# Patient Record
Sex: MALE | Race: WHITE | NOT HISPANIC OR LATINO
[De-identification: names, ages, dates, MRNs, and addresses within clinical notes are randomized per-mention and may not be internally consistent; named-entity substitution may affect disease eponyms.]

---

## 2017-02-02 ENCOUNTER — APPOINTMENT (OUTPATIENT)
Dept: COLORECTAL SURGERY | Facility: CLINIC | Age: 52
End: 2017-02-02

## 2017-04-06 ENCOUNTER — APPOINTMENT (OUTPATIENT)
Dept: COLORECTAL SURGERY | Facility: CLINIC | Age: 52
End: 2017-04-06

## 2017-05-05 ENCOUNTER — MEDICATION RENEWAL (OUTPATIENT)
Age: 52
End: 2017-05-05

## 2017-06-08 ENCOUNTER — APPOINTMENT (OUTPATIENT)
Dept: COLORECTAL SURGERY | Facility: CLINIC | Age: 52
End: 2017-06-08

## 2017-07-11 ENCOUNTER — OUTPATIENT (OUTPATIENT)
Dept: OUTPATIENT SERVICES | Facility: HOSPITAL | Age: 52
LOS: 1 days | End: 2017-07-11
Payer: COMMERCIAL

## 2017-07-11 VITALS
HEART RATE: 69 BPM | HEIGHT: 68 IN | OXYGEN SATURATION: 99 % | DIASTOLIC BLOOD PRESSURE: 84 MMHG | WEIGHT: 235.89 LBS | SYSTOLIC BLOOD PRESSURE: 122 MMHG | RESPIRATION RATE: 16 BRPM | TEMPERATURE: 97 F

## 2017-07-11 DIAGNOSIS — Z90.49 ACQUIRED ABSENCE OF OTHER SPECIFIED PARTS OF DIGESTIVE TRACT: Chronic | ICD-10-CM

## 2017-07-11 DIAGNOSIS — K43.2 INCISIONAL HERNIA WITHOUT OBSTRUCTION OR GANGRENE: ICD-10-CM

## 2017-07-11 DIAGNOSIS — G47.30 SLEEP APNEA, UNSPECIFIED: ICD-10-CM

## 2017-07-11 LAB
BUN SERPL-MCNC: 11 MG/DL — SIGNIFICANT CHANGE UP (ref 7–23)
CALCIUM SERPL-MCNC: 9.4 MG/DL — SIGNIFICANT CHANGE UP (ref 8.4–10.5)
CHLORIDE SERPL-SCNC: 103 MMOL/L — SIGNIFICANT CHANGE UP (ref 98–107)
CO2 SERPL-SCNC: 23 MMOL/L — SIGNIFICANT CHANGE UP (ref 22–31)
CREAT SERPL-MCNC: 0.8 MG/DL — SIGNIFICANT CHANGE UP (ref 0.5–1.3)
GLUCOSE SERPL-MCNC: 85 MG/DL — SIGNIFICANT CHANGE UP (ref 70–99)
HCT VFR BLD CALC: 48.1 % — SIGNIFICANT CHANGE UP (ref 39–50)
HGB BLD-MCNC: 17.1 G/DL — HIGH (ref 13–17)
MCHC RBC-ENTMCNC: 32.7 PG — SIGNIFICANT CHANGE UP (ref 27–34)
MCHC RBC-ENTMCNC: 35.6 % — SIGNIFICANT CHANGE UP (ref 32–36)
MCV RBC AUTO: 92 FL — SIGNIFICANT CHANGE UP (ref 80–100)
NRBC # FLD: 0 — SIGNIFICANT CHANGE UP
PLATELET # BLD AUTO: 218 K/UL — SIGNIFICANT CHANGE UP (ref 150–400)
PMV BLD: 11.5 FL — SIGNIFICANT CHANGE UP (ref 7–13)
POTASSIUM SERPL-MCNC: 4.3 MMOL/L — SIGNIFICANT CHANGE UP (ref 3.5–5.3)
POTASSIUM SERPL-SCNC: 4.3 MMOL/L — SIGNIFICANT CHANGE UP (ref 3.5–5.3)
RBC # BLD: 5.23 M/UL — SIGNIFICANT CHANGE UP (ref 4.2–5.8)
RBC # FLD: 13.8 % — SIGNIFICANT CHANGE UP (ref 10.3–14.5)
SODIUM SERPL-SCNC: 141 MMOL/L — SIGNIFICANT CHANGE UP (ref 135–145)
WBC # BLD: 6.6 K/UL — SIGNIFICANT CHANGE UP (ref 3.8–10.5)
WBC # FLD AUTO: 6.6 K/UL — SIGNIFICANT CHANGE UP (ref 3.8–10.5)

## 2017-07-11 PROCEDURE — 93010 ELECTROCARDIOGRAM REPORT: CPT

## 2017-07-11 NOTE — H&P PST ADULT - PMH
Diverticulitis large intestine    GERD (gastroesophageal reflux disease)    HTN (hypertension)    Obesity    Sleep apnea Diverticulitis large intestine    GERD (gastroesophageal reflux disease)    HTN (hypertension)    Incisional hernia    Obesity    Sleep apnea

## 2017-07-11 NOTE — H&P PST ADULT - HISTORY OF PRESENT ILLNESS
Pt. is a 53 yo male that had a colon resection 10/206.  Pt. now has an incisional hernia. Pt. is a 51 yo male that had a colon resection 10/2016.  Pt. now has an incisional hernia.

## 2017-07-11 NOTE — H&P PST ADULT - FAMILY HISTORY
Father  Still living? No  Family history of colon cancer, Age at diagnosis: Age Unknown     Mother  Still living? Yes, Estimated age: Age Unknown  Family history of diabetes mellitus (DM), Age at diagnosis: Age Unknown

## 2017-07-11 NOTE — H&P PST ADULT - ACTIVITY
"I walk anywhere from a mile and a half to 4 miles, I try and do it everyday" , utilizes subway stairs 5 days/wk

## 2017-07-13 RX ORDER — SODIUM CHLORIDE 9 MG/ML
1000 INJECTION, SOLUTION INTRAVENOUS
Qty: 0 | Refills: 0 | Status: DISCONTINUED | OUTPATIENT
Start: 2017-07-14 | End: 2017-07-14

## 2017-07-13 NOTE — ASU PATIENT PROFILE, ADULT - PMH
Diverticulitis large intestine    GERD (gastroesophageal reflux disease)    HTN (hypertension)    Incisional hernia    Obesity    Sleep apnea

## 2017-07-14 ENCOUNTER — INPATIENT (INPATIENT)
Facility: HOSPITAL | Age: 52
LOS: 3 days | Discharge: HOME HEALTH | End: 2017-07-18
Attending: STUDENT IN AN ORGANIZED HEALTH CARE EDUCATION/TRAINING PROGRAM | Admitting: STUDENT IN AN ORGANIZED HEALTH CARE EDUCATION/TRAINING PROGRAM
Payer: COMMERCIAL

## 2017-07-14 ENCOUNTER — APPOINTMENT (OUTPATIENT)
Dept: COLORECTAL SURGERY | Facility: HOSPITAL | Age: 52
End: 2017-07-14

## 2017-07-14 ENCOUNTER — RESULT REVIEW (OUTPATIENT)
Age: 52
End: 2017-07-14

## 2017-07-14 VITALS
SYSTOLIC BLOOD PRESSURE: 123 MMHG | OXYGEN SATURATION: 100 % | DIASTOLIC BLOOD PRESSURE: 80 MMHG | WEIGHT: 235.89 LBS | TEMPERATURE: 98 F | HEART RATE: 75 BPM | HEIGHT: 68 IN | RESPIRATION RATE: 15 BRPM

## 2017-07-14 DIAGNOSIS — Z90.49 ACQUIRED ABSENCE OF OTHER SPECIFIED PARTS OF DIGESTIVE TRACT: Chronic | ICD-10-CM

## 2017-07-14 DIAGNOSIS — K43.2 INCISIONAL HERNIA WITHOUT OBSTRUCTION OR GANGRENE: ICD-10-CM

## 2017-07-14 PROCEDURE — 88302 TISSUE EXAM BY PATHOLOGIST: CPT | Mod: 26

## 2017-07-14 PROCEDURE — 88304 TISSUE EXAM BY PATHOLOGIST: CPT | Mod: 26

## 2017-07-14 RX ORDER — HEPARIN SODIUM 5000 [USP'U]/ML
5000 INJECTION INTRAVENOUS; SUBCUTANEOUS EVERY 8 HOURS
Qty: 0 | Refills: 0 | Status: DISCONTINUED | OUTPATIENT
Start: 2017-07-14 | End: 2017-07-16

## 2017-07-14 RX ORDER — AMLODIPINE BESYLATE 2.5 MG/1
5 TABLET ORAL DAILY
Qty: 0 | Refills: 0 | Status: DISCONTINUED | OUTPATIENT
Start: 2017-07-15 | End: 2017-07-15

## 2017-07-14 RX ORDER — HYDROMORPHONE HYDROCHLORIDE 2 MG/ML
0.5 INJECTION INTRAMUSCULAR; INTRAVENOUS; SUBCUTANEOUS
Qty: 0 | Refills: 0 | Status: DISCONTINUED | OUTPATIENT
Start: 2017-07-14 | End: 2017-07-15

## 2017-07-14 RX ORDER — NALOXONE HYDROCHLORIDE 4 MG/.1ML
0.1 SPRAY NASAL
Qty: 0 | Refills: 0 | Status: DISCONTINUED | OUTPATIENT
Start: 2017-07-14 | End: 2017-07-15

## 2017-07-14 RX ORDER — ONDANSETRON 8 MG/1
4 TABLET, FILM COATED ORAL EVERY 6 HOURS
Qty: 0 | Refills: 0 | Status: DISCONTINUED | OUTPATIENT
Start: 2017-07-14 | End: 2017-07-15

## 2017-07-14 RX ORDER — SODIUM CHLORIDE 9 MG/ML
1000 INJECTION, SOLUTION INTRAVENOUS
Qty: 0 | Refills: 0 | Status: DISCONTINUED | OUTPATIENT
Start: 2017-07-14 | End: 2017-07-16

## 2017-07-14 RX ADMIN — ONDANSETRON 4 MILLIGRAM(S): 8 TABLET, FILM COATED ORAL at 18:20

## 2017-07-14 RX ADMIN — HEPARIN SODIUM 5000 UNIT(S): 5000 INJECTION INTRAVENOUS; SUBCUTANEOUS at 22:40

## 2017-07-14 NOTE — PROGRESS NOTE ADULT - ASSESSMENT
Pt. is a 53 yo male with pmhx of diverticulitis of the large intestine, HTN, GERD incisional hernia, colon resection, cholecystectomy now s/p incisional hernia repair.     - DVT ppx - heparin subq  - restart home meds  - dilaudid for pain  - NPO

## 2017-07-14 NOTE — BRIEF OPERATIVE NOTE - OPERATION/FINDINGS
Alessandro-Stoppa repair of ventral incisional hernias, bilateral component separation Alessandro-Stoppa repair of ventral incisional hernia, bilateral component separation

## 2017-07-14 NOTE — PROGRESS NOTE ADULT - SUBJECTIVE AND OBJECTIVE BOX
B Team Surgery Post-op Note    Procedure: Incisional hernia repair with mesh    S: Patient feeling nauseous. Vomited once (non-bloody). No flatus or bowel movement. Pain by incision.     O:  PE:  vitals: Temp: 98.8, HR: 98, BP: 118/86; RR 16 SpO2 99%   Abd: Distended, tender to palpation by incision site. Abdominal binder in palce  drains: two ben drains above closed anterior rectus sheath - sanguinous output in both drains  epidural catheter for pain control      I&O's Detail    14 Jul 2017 07:01  -  14 Jul 2017 23:07  --------------------------------------------------------  IN:  Total IN: 0 mL    OUT:    Bulb: 25 mL    Indwelling Catheter - Urethral: 315 mL  Total OUT: 340 mL    Total NET: -340 mL

## 2017-07-15 ENCOUNTER — TRANSCRIPTION ENCOUNTER (OUTPATIENT)
Age: 52
End: 2017-07-15

## 2017-07-15 LAB
APTT BLD: 29.7 SEC — SIGNIFICANT CHANGE UP (ref 27.5–37.4)
BASE EXCESS BLDV CALC-SCNC: -3.5 MMOL/L — SIGNIFICANT CHANGE UP
BASE EXCESS BLDV CALC-SCNC: 0.8 MMOL/L — SIGNIFICANT CHANGE UP
BLD GP AB SCN SERPL QL: NEGATIVE — SIGNIFICANT CHANGE UP
BLOOD GAS VENOUS - CREATININE: 1.08 MG/DL — SIGNIFICANT CHANGE UP (ref 0.5–1.3)
BLOOD GAS VENOUS - CREATININE: 1.14 MG/DL — SIGNIFICANT CHANGE UP (ref 0.5–1.3)
BUN SERPL-MCNC: 17 MG/DL — SIGNIFICANT CHANGE UP (ref 7–23)
CALCIUM SERPL-MCNC: 8.5 MG/DL — SIGNIFICANT CHANGE UP (ref 8.4–10.5)
CHLORIDE BLDV-SCNC: 107 MMOL/L — SIGNIFICANT CHANGE UP (ref 96–108)
CHLORIDE BLDV-SCNC: 110 MMOL/L — HIGH (ref 96–108)
CHLORIDE SERPL-SCNC: 102 MMOL/L — SIGNIFICANT CHANGE UP (ref 98–107)
CO2 SERPL-SCNC: 20 MMOL/L — LOW (ref 22–31)
CREAT SERPL-MCNC: 1.28 MG/DL — SIGNIFICANT CHANGE UP (ref 0.5–1.3)
GAS PNL BLDV: 137 MMOL/L — SIGNIFICANT CHANGE UP (ref 136–146)
GAS PNL BLDV: 139 MMOL/L — SIGNIFICANT CHANGE UP (ref 136–146)
GLUCOSE BLDV-MCNC: 139 — HIGH (ref 70–99)
GLUCOSE BLDV-MCNC: 200 — HIGH (ref 70–99)
GLUCOSE SERPL-MCNC: 202 MG/DL — HIGH (ref 70–99)
HCO3 BLDV-SCNC: 20 MMOL/L — SIGNIFICANT CHANGE UP (ref 20–27)
HCO3 BLDV-SCNC: 24 MMOL/L — SIGNIFICANT CHANGE UP (ref 20–27)
HCT VFR BLD CALC: 42.7 % — SIGNIFICANT CHANGE UP (ref 39–50)
HCT VFR BLDV CALC: 38 % — LOW (ref 39–51)
HCT VFR BLDV CALC: 42.2 % — SIGNIFICANT CHANGE UP (ref 39–51)
HGB BLD-MCNC: 14.8 G/DL — SIGNIFICANT CHANGE UP (ref 13–17)
HGB BLDV-MCNC: 12.4 G/DL — LOW (ref 13–17)
HGB BLDV-MCNC: 13.8 G/DL — SIGNIFICANT CHANGE UP (ref 13–17)
INR BLD: 1.13 — SIGNIFICANT CHANGE UP (ref 0.88–1.17)
LACTATE BLDV-MCNC: 2.4 MMOL/L — HIGH (ref 0.5–2)
LACTATE BLDV-MCNC: 5.6 MMOL/L — CRITICAL HIGH (ref 0.5–2)
MCHC RBC-ENTMCNC: 32.8 PG — SIGNIFICANT CHANGE UP (ref 27–34)
MCHC RBC-ENTMCNC: 34.7 % — SIGNIFICANT CHANGE UP (ref 32–36)
MCV RBC AUTO: 94.7 FL — SIGNIFICANT CHANGE UP (ref 80–100)
NRBC # FLD: 0 — SIGNIFICANT CHANGE UP
PCO2 BLDV: 38 MMHG — LOW (ref 41–51)
PCO2 BLDV: 47 MMHG — SIGNIFICANT CHANGE UP (ref 41–51)
PH BLDV: 7.29 PH — LOW (ref 7.32–7.43)
PH BLDV: 7.43 PH — SIGNIFICANT CHANGE UP (ref 7.32–7.43)
PLATELET # BLD AUTO: 261 K/UL — SIGNIFICANT CHANGE UP (ref 150–400)
PMV BLD: 11.7 FL — SIGNIFICANT CHANGE UP (ref 7–13)
PO2 BLDV: 29 MMHG — LOW (ref 35–40)
PO2 BLDV: 29 MMHG — LOW (ref 35–40)
POTASSIUM BLDV-SCNC: 4.4 MMOL/L — SIGNIFICANT CHANGE UP (ref 3.4–4.5)
POTASSIUM BLDV-SCNC: 4.5 MMOL/L — SIGNIFICANT CHANGE UP (ref 3.4–4.5)
POTASSIUM SERPL-MCNC: 4.7 MMOL/L — SIGNIFICANT CHANGE UP (ref 3.5–5.3)
POTASSIUM SERPL-SCNC: 4.7 MMOL/L — SIGNIFICANT CHANGE UP (ref 3.5–5.3)
PROTHROM AB SERPL-ACNC: 12.7 SEC — SIGNIFICANT CHANGE UP (ref 9.8–13.1)
RBC # BLD: 4.51 M/UL — SIGNIFICANT CHANGE UP (ref 4.2–5.8)
RBC # FLD: 13.9 % — SIGNIFICANT CHANGE UP (ref 10.3–14.5)
RH IG SCN BLD-IMP: POSITIVE — SIGNIFICANT CHANGE UP
SAO2 % BLDV: 45.5 % — LOW (ref 60–85)
SAO2 % BLDV: 52.6 % — LOW (ref 60–85)
SODIUM SERPL-SCNC: 139 MMOL/L — SIGNIFICANT CHANGE UP (ref 135–145)
WBC # BLD: 15.22 K/UL — HIGH (ref 3.8–10.5)
WBC # FLD AUTO: 15.22 K/UL — HIGH (ref 3.8–10.5)

## 2017-07-15 PROCEDURE — 99291 CRITICAL CARE FIRST HOUR: CPT

## 2017-07-15 RX ORDER — SODIUM CHLORIDE 9 MG/ML
1000 INJECTION, SOLUTION INTRAVENOUS ONCE
Qty: 0 | Refills: 0 | Status: COMPLETED | OUTPATIENT
Start: 2017-07-15 | End: 2017-07-15

## 2017-07-15 RX ORDER — ACETAMINOPHEN 500 MG
1000 TABLET ORAL ONCE
Qty: 0 | Refills: 0 | Status: DISCONTINUED | OUTPATIENT
Start: 2017-07-15 | End: 2017-07-18

## 2017-07-15 RX ORDER — HYDROMORPHONE HYDROCHLORIDE 2 MG/ML
0.5 INJECTION INTRAMUSCULAR; INTRAVENOUS; SUBCUTANEOUS
Qty: 0 | Refills: 0 | Status: DISCONTINUED | OUTPATIENT
Start: 2017-07-15 | End: 2017-07-18

## 2017-07-15 RX ORDER — SODIUM CHLORIDE 9 MG/ML
500 INJECTION, SOLUTION INTRAVENOUS ONCE
Qty: 0 | Refills: 0 | Status: DISCONTINUED | OUTPATIENT
Start: 2017-07-15 | End: 2017-07-15

## 2017-07-15 RX ORDER — ONDANSETRON 8 MG/1
4 TABLET, FILM COATED ORAL EVERY 8 HOURS
Qty: 0 | Refills: 0 | Status: DISCONTINUED | OUTPATIENT
Start: 2017-07-15 | End: 2017-07-18

## 2017-07-15 RX ADMIN — HYDROMORPHONE HYDROCHLORIDE 0.5 MILLIGRAM(S): 2 INJECTION INTRAMUSCULAR; INTRAVENOUS; SUBCUTANEOUS at 14:35

## 2017-07-15 RX ADMIN — HEPARIN SODIUM 5000 UNIT(S): 5000 INJECTION INTRAVENOUS; SUBCUTANEOUS at 05:35

## 2017-07-15 RX ADMIN — SODIUM CHLORIDE 125 MILLILITER(S): 9 INJECTION, SOLUTION INTRAVENOUS at 05:36

## 2017-07-15 RX ADMIN — SODIUM CHLORIDE 125 MILLILITER(S): 9 INJECTION, SOLUTION INTRAVENOUS at 05:35

## 2017-07-15 RX ADMIN — SODIUM CHLORIDE 125 MILLILITER(S): 9 INJECTION, SOLUTION INTRAVENOUS at 02:31

## 2017-07-15 RX ADMIN — SODIUM CHLORIDE 125 MILLILITER(S): 9 INJECTION, SOLUTION INTRAVENOUS at 14:39

## 2017-07-15 RX ADMIN — SODIUM CHLORIDE 125 MILLILITER(S): 9 INJECTION, SOLUTION INTRAVENOUS at 05:34

## 2017-07-15 RX ADMIN — SODIUM CHLORIDE 2000 MILLILITER(S): 9 INJECTION, SOLUTION INTRAVENOUS at 02:31

## 2017-07-15 RX ADMIN — HEPARIN SODIUM 5000 UNIT(S): 5000 INJECTION INTRAVENOUS; SUBCUTANEOUS at 14:39

## 2017-07-15 RX ADMIN — HYDROMORPHONE HYDROCHLORIDE 0.5 MILLIGRAM(S): 2 INJECTION INTRAMUSCULAR; INTRAVENOUS; SUBCUTANEOUS at 14:50

## 2017-07-15 RX ADMIN — SODIUM CHLORIDE 2000 MILLILITER(S): 9 INJECTION, SOLUTION INTRAVENOUS at 05:34

## 2017-07-15 RX ADMIN — HEPARIN SODIUM 5000 UNIT(S): 5000 INJECTION INTRAVENOUS; SUBCUTANEOUS at 21:55

## 2017-07-15 NOTE — CONSULT NOTE ADULT - ATTENDING COMMENTS
The patient is a critical care patient with hemodynamic and metabolic instability in SICU.  I have personally interviewed and examined this patient, reviewed labs and x-rays, discussed with other consultants, House staff and PA's.  I spent  35   minutes  in total providing critical care for the diagnoses, assessment and plan above.  These diagnoses are unrelated to the surgical procedure noted above.  Time involved in performance of separately billable procedures was not counted toward my critical care time.  There is no overlap.    dx:  I95.9 Hypotension, unspecified hypotension type   - ?secondary to PCEA. Have stopped PCEA, fluid boluses.  No CP/dyspnea or other complaints  E87.2 Lactic acidosis   - resolving with resuscitation  N17.9 Acute kidney injury   - non-oliguric, continue to monitor  Z91.89 At risk for malnutrition   - will start diet

## 2017-07-15 NOTE — PROGRESS NOTE ADULT - SUBJECTIVE AND OBJECTIVE BOX
INTERVAL HPI/OVERNIGHT EVENTS: Patient with low/borderline BP, PCEA on hold, now called by primary team for PCEA removal.    MEDICATIONS  (STANDING):  heparin  Injectable 5000 Unit(s) SubCutaneous every 8 hours  lactated ringers. 1000 milliLiter(s) (125 mL/Hr) IV Continuous <Continuous>    MEDICATIONS  (PRN):  HYDROmorphone  Injectable 0.5 milliGRAM(s) IV Push every 10 minutes PRN Moderate Pain (4 - 6)  HYDROmorphone  Injectable 0.5 milliGRAM(s) IV Push every 3 hours PRN Severe Pain unrelieved by PCEA      Allergies    No Known Allergies    Intolerances              Vital Signs Last 24 Hrs  T(C): 37.6 (15 Jul 2017 12:00), Max: 37.6 (15 Jul 2017 12:00)  T(F): 99.6 (15 Jul 2017 12:00), Max: 99.6 (15 Jul 2017 12:00)  HR: 97 (15 Jul 2017 14:00) (65 - 107)  BP: 113/78 (15 Jul 2017 13:00) (78/54 - 126/76)  BP(mean): 86 (15 Jul 2017 13:00) (66 - 86)  RR: 12 (15 Jul 2017 14:00) (0 - 22)  SpO2: 100% (15 Jul 2017 14:00) (95% - 100%)    PHYSICAL EXAM:    GENERAL: NAD, well-groomed, well-developed  NERVOUS SYSTEM:  Alert & Oriented X3   BACK/SKIN: Epidural catheter in situ, no redness/inflammation/swelling/erythema      LABS:                        14.8   15.22 )-----------( 261      ( 15 Jul 2017 02:00 )             42.7     07-15    139  |  102  |  17  ----------------------------<  202<H>  4.7   |  20<L>  |  1.28    Ca    8.5      15 Jul 2017 02:35      PT/INR - ( 15 Jul 2017 02:00 )   PT: 12.7 SEC;   INR: 1.13          PTT - ( 15 Jul 2017 02:00 )  PTT:29.7 SEC      RADIOLOGY & ADDITIONAL TESTS:    A/P: 52M POD#1 s/p incisional hernia repair, now with low BP, requested to remove PCEA. Confirmed INR wnl, no a/c, last dose of HSQ @ 5am 7/15/17.  Catheter removed, atraumatic and aseptically, no resistance/complications.  Epidural tip intact.

## 2017-07-15 NOTE — PROGRESS NOTE ADULT - ASSESSMENT
Assessment: This patient is assessed as being a 52 year old male with a hypertension who underwent a Alessandro-Stoppa repair of abdominal wall incisional hernia yesterday. He previously (October 2016) underwent an open low anterior resection for a perforated diverticulitis.  He subsequently developed an incisional hernia for which he underwent repair using [place]-Stoppa technique. The procedure last 4-5 hours, received 2.5 liters intraoperatively. He also underwent placement an lumbar epidural catheter for patient controlled epidural analgesia. He was hypotensive overnight. The hypotensive episodes was thought to be secondary to a combination of under-resuscitation and peripheral vasodilation secondary to continuous infusion of patient controlled epidural analgesia. His serum lactate improved after fluid resuscitation.   Plan to:   1)   Continue to provide parenteral nutrition, keep his indwelling haskins catheter to accurately measure his urine output.   2)   Allow him to have clear liquid diet.   3)   Discussed with Dr. Landon Assessment: This patient is assessed as being a 52 year old male with hypertension who underwent a Dyer-Stoppa repair of abdominal wall incisional hernia yesterday. He previously (October 2016) underwent an open low anterior resection for a perforated diverticulitis.  He subsequently developed an incisional hernia for which he underwent repair using Dyer-Stoppa technique. The procedure last 4-5 hours, received 2.5 liters intraoperatively. He also underwent placement of a lumbar epidural catheter for patient controlled epidural analgesia. He was hypotensive overnight. The hypotensive episodes was thought to be secondary to a combination of under-resuscitation and peripheral vasodilation secondary to continuous infusion of patient controlled epidural analgesia. His serum lactate improved after fluid resuscitation.   Plan to:   1)   Continue to provide parenteral nutrition, keep his indwelling haskins catheter to accurately measure his urine output.   2)   Allow him to have clear liquid diet.   3)   Discussed with Dr. Landon

## 2017-07-15 NOTE — PROVIDER CONTACT NOTE (OTHER) - ASSESSMENT
A&O x4. no s/s of resp distress were noted. wound vac in place, b/l abdominal KIMBERLY sites CDI. Rutherford in place.

## 2017-07-15 NOTE — PROGRESS NOTE ADULT - SUBJECTIVE AND OBJECTIVE BOX
Anesthesia Pain Management Service: Day _1_ of Epidural    SUBJECTIVE: Patient doing well with PCEA and no problems.  Pain Scale Score:   Refer to charted pain scores    THERAPY:  [x ] Epidural Bupivacaine 0.0625% and Hydromorphone  		[ ] 10 micrograms/mL	[ ] 5 micrograms/mL  [ ] Epidural Bupivacaine 0.0625% and Fentanyl - 2 micrograms/mL  [ ] Epidural Ropivacaine 0.1% plain – 1 mg/mL  [ ] Patient Controlled Regional Anesthesia (PCRA) Ropivacaine  		[ ] 0.2%			[ ] 0.1%    **SETTINGS ON HOLD   Demand dose __0_ lockout __0_ (minutes) Continuous Rate ___ Total: ___ Daily      MEDICATIONS  (STANDING):  HYDROmorphone (10 MICROgram(s)/mL) + BUpivacaine 0.0625% in 0.9% Sodium Chloride PCEA 250 milliLiter(s) Epidural PCA Continuous  heparin  Injectable 5000 Unit(s) SubCutaneous every 8 hours  lactated ringers. 1000 milliLiter(s) (125 mL/Hr) IV Continuous <Continuous>    MEDICATIONS  (PRN):  HYDROmorphone (10 MICROgram(s)/mL) + BUpivacaine 0.0625% in 0.9% Sodium Chloride PCEA Rescue Clinician  Bolus 5 milliLiter(s) Epidural every 15 minutes PRN for Pain Score greater than 6  naloxone Injectable 0.1 milliGRAM(s) IV Push every 3 minutes PRN For ANY of the following changes in patient status:  A. RR LESS THAN 10 breaths per minute, B. Oxygen saturation LESS THAN 90%, C. Sedation score of 6  ondansetron Injectable 4 milliGRAM(s) IV Push every 6 hours PRN Nausea  HYDROmorphone  Injectable 0.5 milliGRAM(s) IV Push every 10 minutes PRN Moderate Pain (4 - 6)  HYDROmorphone  Injectable 0.5 milliGRAM(s) IV Push every 3 hours PRN Severe Pain unrelieved by PCEA      OBJECTIVE: Patient with low BPs- being transferred to SICU    Assessment of Catheter Site:	[ ] Left	[ ] Right  [x ] Epidural 	[ ] Femoral	      [ ] Saphenous   [ ] Supraclavicular   [ ] Other:    [x ] Dressing intact	[x ] Site non-tender	[ x] Site without erythema, discharge, edema  [x ] Epidural tubing and connection checked	[x] Gross neurological exam within normal limits  [ ] Catheter removed – tip intact		[x ] Afebrile	[ ] Febrile: ___    PT/INR - ( 15 Jul 2017 02:00 )   PT: 12.7 SEC;   INR: 1.13          PTT - ( 15 Jul 2017 02:00 )  PTT:29.7 SEC                      14.8   15.22 )-----------( 261      ( 15 Jul 2017 02:00 )             42.7     Vital Signs Last 24 Hrs  T(C): 37.1 (07-15-17 @ 09:02), Max: 37.3 (07-15-17 @ 05:33)  T(F): 98.8 (07-15-17 @ 09:02), Max: 99.2 (07-15-17 @ 05:33)  HR: 101 (07-15-17 @ 09:02) (65 - 107)  BP: 87/56 (07-15-17 @ 09:02) (78/54 - 126/76)  BP(mean): --  RR: 16 (07-15-17 @ 09:02) (15 - 22)  SpO2: 98% (07-15-17 @ 09:02) (95% - 100%)      Sedation Score:	[x ] Alert	[ ] Drowsy	[ ] Arousable	[ ] Asleep	[ ] Unresponsive    Side Effects:	[x ] None	[ ] Nausea	[ ] Vomiting	[ ] Pruritus  		[ ] Weakness		[ ] Numbness	[ ] Other:    ASSESSMENT/ PLAN:    Therapy to  be:	[x ] Continue   [ ] Discontinued   [ ] Change to prn Analgesics    Documentation and Verification of current medications:  [ X ] Done	[ ] Not done, not eligible, reason:    Comments: Epidural turned off overnight for low BPs- please keep catheter in place, call [phone] to test catheter and reinitiate.  Please keep order active and pump connected.  Consider restarting at low dose for pain control once patient appropriately resuscitated medically.

## 2017-07-15 NOTE — CONSULT NOTE ADULT - SUBJECTIVE AND OBJECTIVE BOX
SICU Consultation Note  =====================================================  HPI: 52y male POD 1 s/p incisional hernia repair, now with hypotension and elevated lactate.     Surgery Information  Case Duration: 	EBL: 40 	IV Fluids: 2500 	Blood Products: x	Urine Output: 500  Complications: none    HPI:  Pt. is a 53 yo male that had a colon resection 10/2016.  Pt. now has an incisional hernia. (11 Jul 2017 09:33)    Allergies:   PAST MEDICAL & SURGICAL HISTORY:  Incisional hernia  Sleep apnea  Obesity  Diverticulitis large intestine  HTN (hypertension)  GERD (gastroesophageal reflux disease)  S/P colon resection: 10/2016  S/P cholecystectomy: Laparoscopic - 2014  No Past Surgical History    FAMILY HISTORY:  Family history of diabetes mellitus (DM) (Mother)  Family history of colon cancer (Father)    ADVANCE DIRECTIVES: Presumed Full Code    REVIEW OF SYSTEMS:  General: Non-Contributory  Skin/Breast: Non-Contributory  Ophthalmologic: Non-Contributory  ENMT: Non-Contributory  Respiratory and Thorax: Non-Contributory  Cardiovascular: Non-Contributory  Gastrointestinal: Non-Contributory  Genitourinary: Non-Contributory  Musculoskeletal: Non-Contributory  Neurological: Non-Contributory  Psychiatric: Non-Contributory  Hematology/Lymphatics: Non-Contributory  Endocrine: Non-Contributory  Allergic/Immunologic: Non-Contributory    HOME MEDICATIONS:   · 	ibuprofen 400 mg oral tablet: Last Dose Taken:  , 1 tab(s) orally every 6 hours, As needed, moderate pain last dose 2 weeks ago  · 	amLODIPine 5 mg oral tablet: Last Dose Taken:  , 1 tab(s) orally once a day - AM  · 	pantoprazole 40 mg oral delayed release tablet: Last Dose Taken:  , 1 tab(s) orally once a day am    CURRENT MEDICATIONS:   --------------------------------------------------------------------------------------  Neurologic Medications  HYDROmorphone (10 MICROgram(s)/mL) + BUpivacaine 0.0625% in 0.9% Sodium Chloride PCEA 250 milliLiter(s) Epidural PCA Continuous  HYDROmorphone (10 MICROgram(s)/mL) + BUpivacaine 0.0625% in 0.9% Sodium Chloride PCEA Rescue Clinician  Bolus 5 milliLiter(s) Epidural every 15 minutes PRN for Pain Score greater than 6  ondansetron Injectable 4 milliGRAM(s) IV Push every 6 hours PRN Nausea  HYDROmorphone  Injectable 0.5 milliGRAM(s) IV Push every 10 minutes PRN Moderate Pain (4 - 6)  HYDROmorphone  Injectable 0.5 milliGRAM(s) IV Push every 3 hours PRN Severe Pain unrelieved by PCEA    Respiratory Medications    Cardiovascular Medications    Gastrointestinal Medications  lactated ringers. 1000 milliLiter(s) IV Continuous <Continuous>  lactated ringers Bolus 500 milliLiter(s) IV Bolus once    Genitourinary Medications    Hematologic/Oncologic Medications  heparin  Injectable 5000 Unit(s) SubCutaneous every 8 hours    Antimicrobial/Immunologic Medications    Endocrine/Metabolic Medications    Topical/Other Medications  naloxone Injectable 0.1 milliGRAM(s) IV Push every 3 minutes PRN For ANY of the following changes in patient status:  A. RR LESS THAN 10 breaths per minute, B. Oxygen saturation LESS THAN 90%, C. Sedation score of 6    --------------------------------------------------------------------------------------    VITAL SIGNS, INS/OUTS (last 24 hours):  --------------------------------------------------------------------------------------  T(C): 37.3 (07-15-17 @ 05:33), Max: 37.3 (07-15-17 @ 05:33)  HR: 104 (07-15-17 @ 05:33) (65 - 107)  BP: 88/59 (07-15-17 @ 05:33) (78/54 - 126/76)  BP(mean): --  ABP: --  ABP(mean): --  RR: 16 (07-15-17 @ 05:33) (15 - 22)  SpO2: 97% (07-15-17 @ 05:33) (95% - 100%)  Wt(kg): --  CVP(mm Hg): --  CI: --  CAPILLARY BLOOD GLUCOSE       N/A      07-14 @ 07:01  -  07-15 @ 05:45  --------------------------------------------------------  IN:  Total IN: 0 mL    OUT:    Bulb: 25 mL    Bulb: 60 mL    Indwelling Catheter - Urethral: 515 mL  Total OUT: 600 mL    Total NET: -600 mL        --------------------------------------------------------------------------------------    EXAM  NEUROLOGY  RASS: 0  	GCS:  15  Exam: Normal, NAD, alert, oriented x 3, no focal deficits.  HEENT  Exam: Normocephalic, atraumatic.  EOMI   RESPIRATORY  Exam: Lungs clear to auscultation, Normal expansion/effort.    CARDIOVASCULAR  Exam: S1, S2.  Regular rate and rhythm.  GI/NUTRITION  Exam: Abdomen soft, Non-tender, Non-distended, proveena vac in place with binder over  Current Diet:  NPO   VASCULAR  Exam: Extremities warm, pink, well-perfused.   MUSCULOSKELETAL  Exam: All extremities moving spontaneously without limitations.    SKIN:  Exam: Good skin turgor, no skin breakdown.    METABOLIC/FLUIDS/ELECTROLYTES  lactated ringers. 1000 milliLiter(s) IV Continuous <Continuous>  lactated ringers Bolus 500 milliLiter(s) IV Bolus once      HEMATOLOGIC  [x] DVT Prophylaxis: heparin  Injectable 5000 Unit(s) SubCutaneous every 8 hours    Transfusions:	[] PRBC	[] Platelets		[] FFP	[] Cryoprecipitate    Tubes/Lines/Drains   [x] Peripheral IV  [] Central Venous Line     	[] R	[] L	[] IJ	[] Fem	[] SC	Date Placed:   [] Arterial Line		[] R	[] L	[] Fem	[] Rad	[] Ax	Date Placed:   [] PICC:         	[] Midline		[] Mediport  [x] Urinary Catheter		Date Placed:     LABS  --------------------------------------------------------------------------------------  CBC Full  -  ( 15 Jul 2017 02:00 )  WBC Count : 15.22 K/uL  Hemoglobin : 14.8 g/dL  Hematocrit : 42.7 %  Platelet Count - Automated : 261 K/uL  Mean Cell Volume : 94.7 fL  Mean Cell Hemoglobin : 32.8 pg  Mean Cell Hemoglobin Concentration : 34.7 %  Auto Neutrophil # : x  Auto Lymphocyte # : x  Auto Monocyte # : x  Auto Eosinophil # : x  Auto Basophil # : x  Auto Neutrophil % : x  Auto Lymphocyte % : x  Auto Monocyte % : x  Auto Eosinophil % : x  Auto Basophil % : x    07-15    139  |  102  |  17  ----------------------------<  202<H>  4.7   |  20<L>  |  1.28    Ca    8.5      15 Jul 2017 02:35        CAPILLARY BLOOD GLUCOSE          PT/INR - ( 15 Jul 2017 02:00 )   PT: 12.7 SEC;   INR: 1.13          PTT - ( 15 Jul 2017 02:00 )  PTT:29.7 SEC  --------------------------------------------------------------------------------------    OTHER LABS    IMAGING RESULTS  Echo:   CT:   Xray:     ASSESSMENT:  52y Male s/p ventral hernia repair with hypotension and elevated lactate    PLAN:   Neurologic: awake, alert, and oriented in no acute distress, holding PCEA for now due to hypotension, will utilize other means of pain control  Respiratory: breathing comfortably laying flat with no increased work of breathing, may wean nasal cannula if tolerates  Cardiovascular: holding home BP medications, currently finishing 2.5L bolus, will recheck lactate this am  Gastrointestinal/Nutrition: Keep NPO for now   Renal/Genitourinary: divine for monitoring while has PCA, strict I's and O's  Hematologic: monitoring H&H no signs of bleeding at this time  Infectious Disease: no concerns   Tubes/Lines/Drains: LEVAR haskins  Endocrine: no concerns  Disposition: awaiting SICU bed to become available    --------------------------------------------------------------------------------------    Critical Care Diagnoses:  hypotension  s/p incisional hernia repair

## 2017-07-15 NOTE — PROVIDER CONTACT NOTE (OTHER) - ASSESSMENT
A&O x4 PCEA pump in place. Denies chest pain, no SOB. Rutherford in place. Left abd KIMBERLY with sanguineous output.

## 2017-07-15 NOTE — PROGRESS NOTE ADULT - SUBJECTIVE AND OBJECTIVE BOX
GENERAL SURGERY DAILY PROGRESS NOTE:     Hospital Day: 2    Postoperative Day: 1    Status post: Alessandro-Stoppa repair of abdominal wall incisional hernia.     Subjective: Patient was evaluated by the surgical intensive care unit this morning, he was hypotensive. He was asymptomatic. This morning, he denied dizziness, nausea, or vomiting.   His pain was controlled. He denied severe abdominal pain. He was transferred to the surgical intensive care unit for fluid resuscitation.         Objective:   ICU Vital Signs Last 24 Hrs  T(C): 37.2 (15 Jul 2017 16:00), Max: 37.6 (15 Jul 2017 12:00)  T(F): 98.9 (15 Jul 2017 16:00), Max: 99.6 (15 Jul 2017 12:00)  HR: 98 (15 Jul 2017 16:00) (65 - 107)  BP: 97/75 (15 Jul 2017 16:00) (78/54 - 126/76)  BP(mean): 80 (15 Jul 2017 16:00) (66 - 86)  ABP: --  ABP(mean): --  RR: 17 (15 Jul 2017 16:00) (0 - 22)  SpO2: 95% (15 Jul 2017 16:00) (95% - 100%)    Exam:   Gen: no acute distress.   Resp: Clear to ascultation bilaterally.   CV: RRR, no JVD, no muffled heart sounds, no murmurs.   Abd: soft, mildly distended, appropriately tender to palpation, Prevena VAC in place with good seal. Two Azam-Mckeon drains with sanguinous output.   Extremities: no swelling.                14.8   15.22 )-----------( 261      ( 15 Jul 2017 02:00 )             42.7     07-15    139  |  102  |  17  ----------------------------<  202<H>  4.7   |  20<L>  |  1.28    Ca    8.5      15 Jul 2017 02:35    PT/INR - ( 15 Jul 2017 02:00 )   PT: 12.7 SEC;   INR: 1.13          PTT - ( 15 Jul 2017 02:00 )  PTT:29.7 SEC    Normal coagulation profile.     He remains on:     1)    Unfractionated subcutaneous heparin  Injectable 5000 Unitsevery 8 hours  2)    Balanced salt solution lactated ringers infused through a peripheral vein at a rate of 125 mL/Hr.    3)    Intravenous acetaminophen (Ofirmev) 1000 mg once  4)    Hydromorphone 0.5 mg intravenous push every 3 hours given in a Pro Re Priscila fashion    5)    Clear liquid diet         I&O's Detail    14 Jul 2017 07:01  -  15 Jul 2017 07:00  --------------------------------------------------------  IN:  Total IN: 0 mL    OUT:    Bulb: 67.5 mL    Bulb: 75 mL    Indwelling Catheter - Urethral: 715 mL  Total OUT: 857.5 mL    Total NET: -857.5 mL      15 Jul 2017 07:01  -  15 Jul 2017 16:50  --------------------------------------------------------  IN:    lactated ringers.: 1000 mL  Total IN: 1000 mL    OUT:    Indwelling Catheter - Urethral: 500 mL  Total OUT: 500 mL    Total NET: 500 mL

## 2017-07-15 NOTE — PROGRESS NOTE ADULT - SUBJECTIVE AND OBJECTIVE BOX
Contacted by RN when bp returned as 87/61 with a HR of 114. Patient has more recently been complaining of nausea, vomiting and abdominal pain. KIMBERLY drains are functioning, with 85 cc overnight.     Patient is not complaining of chest pain, shortness of breath, dizziness    PE:  vitals: BP: 87/61, , temp 97.7   Abd: Tender to palpation around the vac site, soft, distended      Pt. is a 51 yo male with pmhx of diverticulitis of the large intestine, HTN, GERD incisional hernia, colon resection, cholecystectomy now s/p incisional hernia repair.     Plan:   - STAT CBC, BMP, Type and Screen, Blood venous gas  - 1 Liter LR bolus

## 2017-07-15 NOTE — PROGRESS NOTE ADULT - SUBJECTIVE AND OBJECTIVE BOX
Provider contacted for hypotension. Clinical impact nurse also called when lactate of 5.6 returned from lab. White Count of 15. Patient has been receiving boluses throughout the early morning and reports feeling improved.    Vitals at 4 am:  Right arm 89/59,   Left arm: 78/54,     Gen: AAOX3, color pink, mentating  Abd: Tender, distended, soft    Plan:  Patient in septic shock  - 2 large bore IV lines  - Fluid resuscitate, 2.5 L bolus total   - stop PCEA  - Continue to trend lactate  - SICU consult

## 2017-07-15 NOTE — PROVIDER CONTACT NOTE (CRITICAL VALUE NOTIFICATION) - ASSESSMENT
A&O x4. PCEA pump in place. NPO. No SOB, no chest pain noted. Prevena vac in place. Pt has no active bleeding, incsional sites are CDI. Rutherford in place with adequate urine output

## 2017-07-16 LAB
APTT BLD: 28.6 SEC — SIGNIFICANT CHANGE UP (ref 27.5–37.4)
BASE EXCESS BLDV CALC-SCNC: 2 MMOL/L — SIGNIFICANT CHANGE UP
BLOOD GAS VENOUS - CREATININE: 0.79 MG/DL — SIGNIFICANT CHANGE UP (ref 0.5–1.3)
BUN SERPL-MCNC: 13 MG/DL — SIGNIFICANT CHANGE UP (ref 7–23)
CALCIUM SERPL-MCNC: 8.3 MG/DL — LOW (ref 8.4–10.5)
CHLORIDE BLDV-SCNC: 106 MMOL/L — SIGNIFICANT CHANGE UP (ref 96–108)
CHLORIDE SERPL-SCNC: 104 MMOL/L — SIGNIFICANT CHANGE UP (ref 98–107)
CO2 SERPL-SCNC: 22 MMOL/L — SIGNIFICANT CHANGE UP (ref 22–31)
CREAT SERPL-MCNC: 0.72 MG/DL — SIGNIFICANT CHANGE UP (ref 0.5–1.3)
GAS PNL BLDV: 136 MMOL/L — SIGNIFICANT CHANGE UP (ref 136–146)
GLUCOSE BLDV-MCNC: 125 — HIGH (ref 70–99)
GLUCOSE SERPL-MCNC: 119 MG/DL — HIGH (ref 70–99)
HBA1C BLD-MCNC: 4.9 % — SIGNIFICANT CHANGE UP (ref 4–5.6)
HCO3 BLDV-SCNC: 25 MMOL/L — SIGNIFICANT CHANGE UP (ref 20–27)
HCT VFR BLD CALC: 27.1 % — LOW (ref 39–50)
HCT VFR BLD CALC: 29.4 % — LOW (ref 39–50)
HCT VFR BLD CALC: 29.8 % — LOW (ref 39–50)
HCT VFR BLD CALC: 31 % — LOW (ref 39–50)
HCT VFR BLDV CALC: 38.4 % — LOW (ref 39–51)
HGB BLD-MCNC: 10.4 G/DL — LOW (ref 13–17)
HGB BLD-MCNC: 10.4 G/DL — LOW (ref 13–17)
HGB BLD-MCNC: 10.8 G/DL — LOW (ref 13–17)
HGB BLD-MCNC: 9.6 G/DL — LOW (ref 13–17)
HGB BLDV-MCNC: 12.5 G/DL — LOW (ref 13–17)
INR BLD: 1.14 — SIGNIFICANT CHANGE UP (ref 0.88–1.17)
LACTATE BLDV-MCNC: 1.8 MMOL/L — SIGNIFICANT CHANGE UP (ref 0.5–2)
MAGNESIUM SERPL-MCNC: 1.6 MG/DL — SIGNIFICANT CHANGE UP (ref 1.6–2.6)
MCHC RBC-ENTMCNC: 32.2 PG — SIGNIFICANT CHANGE UP (ref 27–34)
MCHC RBC-ENTMCNC: 32.7 PG — SIGNIFICANT CHANGE UP (ref 27–34)
MCHC RBC-ENTMCNC: 32.8 PG — SIGNIFICANT CHANGE UP (ref 27–34)
MCHC RBC-ENTMCNC: 33.2 PG — SIGNIFICANT CHANGE UP (ref 27–34)
MCHC RBC-ENTMCNC: 34.8 % — SIGNIFICANT CHANGE UP (ref 32–36)
MCHC RBC-ENTMCNC: 34.9 % — SIGNIFICANT CHANGE UP (ref 32–36)
MCHC RBC-ENTMCNC: 35.4 % — SIGNIFICANT CHANGE UP (ref 32–36)
MCHC RBC-ENTMCNC: 35.4 % — SIGNIFICANT CHANGE UP (ref 32–36)
MCV RBC AUTO: 92.3 FL — SIGNIFICANT CHANGE UP (ref 80–100)
MCV RBC AUTO: 92.5 FL — SIGNIFICANT CHANGE UP (ref 80–100)
MCV RBC AUTO: 93.8 FL — SIGNIFICANT CHANGE UP (ref 80–100)
MCV RBC AUTO: 94.2 FL — SIGNIFICANT CHANGE UP (ref 80–100)
NRBC # FLD: 0 — SIGNIFICANT CHANGE UP
PCO2 BLDV: 43 MMHG — SIGNIFICANT CHANGE UP (ref 41–51)
PH BLDV: 7.41 PH — SIGNIFICANT CHANGE UP (ref 7.32–7.43)
PHOSPHATE SERPL-MCNC: 1.8 MG/DL — LOW (ref 2.5–4.5)
PLATELET # BLD AUTO: 187 K/UL — SIGNIFICANT CHANGE UP (ref 150–400)
PLATELET # BLD AUTO: 187 K/UL — SIGNIFICANT CHANGE UP (ref 150–400)
PLATELET # BLD AUTO: 190 K/UL — SIGNIFICANT CHANGE UP (ref 150–400)
PLATELET # BLD AUTO: 225 K/UL — SIGNIFICANT CHANGE UP (ref 150–400)
PMV BLD: 10.6 FL — SIGNIFICANT CHANGE UP (ref 7–13)
PMV BLD: 11 FL — SIGNIFICANT CHANGE UP (ref 7–13)
PMV BLD: 11.1 FL — SIGNIFICANT CHANGE UP (ref 7–13)
PMV BLD: 11.4 FL — SIGNIFICANT CHANGE UP (ref 7–13)
PO2 BLDV: 25 MMHG — LOW (ref 35–40)
POTASSIUM BLDV-SCNC: 4 MMOL/L — SIGNIFICANT CHANGE UP (ref 3.4–4.5)
POTASSIUM SERPL-MCNC: 4.2 MMOL/L — SIGNIFICANT CHANGE UP (ref 3.5–5.3)
POTASSIUM SERPL-SCNC: 4.2 MMOL/L — SIGNIFICANT CHANGE UP (ref 3.5–5.3)
PROTHROM AB SERPL-ACNC: 12.8 SEC — SIGNIFICANT CHANGE UP (ref 9.8–13.1)
RBC # BLD: 2.89 M/UL — LOW (ref 4.2–5.8)
RBC # BLD: 3.18 M/UL — LOW (ref 4.2–5.8)
RBC # BLD: 3.23 M/UL — LOW (ref 4.2–5.8)
RBC # BLD: 3.29 M/UL — LOW (ref 4.2–5.8)
RBC # FLD: 13.7 % — SIGNIFICANT CHANGE UP (ref 10.3–14.5)
RBC # FLD: 13.9 % — SIGNIFICANT CHANGE UP (ref 10.3–14.5)
SAO2 % BLDV: 36.2 % — LOW (ref 60–85)
SODIUM SERPL-SCNC: 139 MMOL/L — SIGNIFICANT CHANGE UP (ref 135–145)
WBC # BLD: 10.77 K/UL — HIGH (ref 3.8–10.5)
WBC # BLD: 11.57 K/UL — HIGH (ref 3.8–10.5)
WBC # BLD: 12.49 K/UL — HIGH (ref 3.8–10.5)
WBC # BLD: 13.7 K/UL — HIGH (ref 3.8–10.5)
WBC # FLD AUTO: 10.77 K/UL — HIGH (ref 3.8–10.5)
WBC # FLD AUTO: 11.57 K/UL — HIGH (ref 3.8–10.5)
WBC # FLD AUTO: 12.49 K/UL — HIGH (ref 3.8–10.5)
WBC # FLD AUTO: 13.7 K/UL — HIGH (ref 3.8–10.5)

## 2017-07-16 PROCEDURE — 99233 SBSQ HOSP IP/OBS HIGH 50: CPT | Mod: GC

## 2017-07-16 RX ORDER — MAGNESIUM SULFATE 500 MG/ML
2 VIAL (ML) INJECTION ONCE
Qty: 0 | Refills: 0 | Status: COMPLETED | OUTPATIENT
Start: 2017-07-16 | End: 2017-07-16

## 2017-07-16 RX ORDER — DEXTROSE MONOHYDRATE, SODIUM CHLORIDE, AND POTASSIUM CHLORIDE 50; .745; 4.5 G/1000ML; G/1000ML; G/1000ML
1000 INJECTION, SOLUTION INTRAVENOUS
Qty: 0 | Refills: 0 | Status: DISCONTINUED | OUTPATIENT
Start: 2017-07-16 | End: 2017-07-17

## 2017-07-16 RX ORDER — PANTOPRAZOLE SODIUM 20 MG/1
40 TABLET, DELAYED RELEASE ORAL DAILY
Qty: 0 | Refills: 0 | Status: DISCONTINUED | OUTPATIENT
Start: 2017-07-16 | End: 2017-07-18

## 2017-07-16 RX ADMIN — HYDROMORPHONE HYDROCHLORIDE 0.5 MILLIGRAM(S): 2 INJECTION INTRAMUSCULAR; INTRAVENOUS; SUBCUTANEOUS at 02:50

## 2017-07-16 RX ADMIN — HYDROMORPHONE HYDROCHLORIDE 0.5 MILLIGRAM(S): 2 INJECTION INTRAMUSCULAR; INTRAVENOUS; SUBCUTANEOUS at 09:30

## 2017-07-16 RX ADMIN — Medication 50 GRAM(S): at 05:07

## 2017-07-16 RX ADMIN — HYDROMORPHONE HYDROCHLORIDE 0.5 MILLIGRAM(S): 2 INJECTION INTRAMUSCULAR; INTRAVENOUS; SUBCUTANEOUS at 17:50

## 2017-07-16 RX ADMIN — ONDANSETRON 4 MILLIGRAM(S): 8 TABLET, FILM COATED ORAL at 10:29

## 2017-07-16 RX ADMIN — HYDROMORPHONE HYDROCHLORIDE 0.5 MILLIGRAM(S): 2 INJECTION INTRAMUSCULAR; INTRAVENOUS; SUBCUTANEOUS at 03:05

## 2017-07-16 RX ADMIN — Medication 63.75 MILLIMOLE(S): at 05:08

## 2017-07-16 RX ADMIN — DEXTROSE MONOHYDRATE, SODIUM CHLORIDE, AND POTASSIUM CHLORIDE 100 MILLILITER(S): 50; .745; 4.5 INJECTION, SOLUTION INTRAVENOUS at 09:17

## 2017-07-16 RX ADMIN — PANTOPRAZOLE SODIUM 40 MILLIGRAM(S): 20 TABLET, DELAYED RELEASE ORAL at 09:17

## 2017-07-16 RX ADMIN — HYDROMORPHONE HYDROCHLORIDE 0.5 MILLIGRAM(S): 2 INJECTION INTRAMUSCULAR; INTRAVENOUS; SUBCUTANEOUS at 17:35

## 2017-07-16 RX ADMIN — HYDROMORPHONE HYDROCHLORIDE 0.5 MILLIGRAM(S): 2 INJECTION INTRAMUSCULAR; INTRAVENOUS; SUBCUTANEOUS at 09:15

## 2017-07-16 RX ADMIN — HEPARIN SODIUM 5000 UNIT(S): 5000 INJECTION INTRAVENOUS; SUBCUTANEOUS at 05:07

## 2017-07-16 NOTE — PROGRESS NOTE ADULT - ASSESSMENT
52M POD2 s/p Retrorectus incisional hernia repair after lap LAR, post-op complicated by hypotension in the setting of PCEA. Since then, PCEA has been removed and his BP improved. However, in the setting of receiving 4L fluid bolus yesterday, his H/H decreased and his KIMBERLY output turned more sanguinous than before. He also reports increased pain

## 2017-07-16 NOTE — PROGRESS NOTE ADULT - SUBJECTIVE AND OBJECTIVE BOX
SICU Daily Progress Note  =====================================================  Interval/Overnight Events:    No acute events overnight     POD # 2         	SICU Day #   1    HPI:    Pt. is a 51 yo male that had a colon resection 10/2016.  Pt. now has an incisional hernia.     PMH:      Diverticulitis large intestine    GERD (gastroesophageal reflux disease)    HTN (hypertension)    Incisional hernia    Obesity    Sleep apnea.    Past Surgical History:  S/P cholecystectomy  Laparoscopic - 2014  S/P colon resection  10/2016      Allergies: No Known Allergies      MEDICATIONS:   --------------------------------------------------------------------------------------  Neurologic Medications  HYDROmorphone  Injectable 0.5 milliGRAM(s) IV Push every 3 hours PRN Moderate Pain (4 - 6)  acetaminophen  IVPB. 1000 milliGRAM(s) IV Intermittent once  ondansetron   Disintegrating Tablet 4 milliGRAM(s) Oral every 8 hours PRN Nausea and/or Vomiting    Respiratory Medications    Cardiovascular Medications    Gastrointestinal Medications  lactated ringers. 1000 milliLiter(s) IV Continuous <Continuous>    Genitourinary Medications    Hematologic/Oncologic Medications  heparin  Injectable 5000 Unit(s) SubCutaneous every 8 hours    Antimicrobial/Immunologic Medications    Endocrine/Metabolic Medications    Topical/Other Medications    --------------------------------------------------------------------------------------    VITAL SIGNS, INS/OUTS (last 24 hours):  --------------------------------------------------------------------------------------  ((Insert SICU Vitals/Is+Os here))***  --------------------------------------------------------------------------------------    EXAM  NEUROLOGY  RASS:   	GCS:    Exam: Normal, NAD, alert, oriented x3, no focal deficits.    HEENT  Exam: Normocephalic, atraumatic, EOMI.     RESPIRATORY  Exam: Lungs clear to auscultation, Normal expansion/effort.      CARDIOVASCULAR  Exam: S1, S2.  Regular rate and rhythm.    GI/NUTRITION  Exam: Abdomen soft, Non-tender, Non-distended.  KIMBERLY drains in place : serosanguinous output   Current Diet:  Clears     VASCULAR  Exam: Extremities warm, pink, well-perfused.    MUSCULOSKELETAL  Exam: All extremities moving spontaneously without limitations.    SKIN  Exam: Good skin turgor, no skin breakdown.     METABOLIC/FLUIDS/ELECTROLYTES  lactated ringers. 1000 milliLiter(s) IV Continuous <Continuous>      HEMATOLOGIC  [x] VTE Prophylaxis: heparin  Injectable 5000 Unit(s) SubCutaneous every 8 hours    Transfusions:	[] PRBC	[] Platelets		[] FFP	[] Cryoprecipitate    INFECTIOUS DISEASE  Antimicrobials/Immunologic Medications:    Day #      of         Tubes/Lines/Drains  ***  [x] Peripheral IV  [] Central Venous Line     	[] R	[] L	[] IJ	[] Fem	[] SC	Date Placed:   [] Arterial Line		[] R	[] L	[] Fem	[] Rad	[] Ax	Date Placed:   [] PICC		[] Midline		[] Mediport  [X] Urinary Catheter		Date Placed:   [x] Necessity of urinary, arterial, and venous catheters discussed    LABS  --------------------------------------------------------------------------------------  ((Insert SICU Labs here))***  --------------------------------------------------------------------------------------    OTHER LABORATORY:     IMAGING STUDIES:   CXR:     ASSESSMENT:  Assessment: 52 year old male who underwent a [place]-Stoppa repair of abdominal wall incisional hernia yesterday. He previously (October 2016) underwent an open low anterior resection for a perforated diverticulitis.  He subsequently developed an incisional hernia for which he underwent repair using Alessandro-Stoppa technique. The procedure last 4-5 hours, received 2.5 liters intraoperatively. He also underwent placement of a lumbar epidural catheter for patient controlled epidural analgesia. He was hypotensive overnight. The hypotensive episodes was thought to be secondary to a combination of under-resuscitation and peripheral vasodilation secondary to continuous infusion of patient controlled epidural analgesia. His serum lactate improved after fluid resuscitation.       PLAN:    Neurologic: On Dilaudid for PRN pain control   Respiratory: On nasal Cannula , sating well , wean as tolerated   Cardiovascular: No acute issues . Stable blood pressures   Gastrointestinal/Nutrition: Clear Diet , On Zofran   Renal/Genitourinary: Monitor lytes , replete as needed   Hematologic: On SQH for VTE prophylaxis   Infectious Disease: No issues  Tubes/Lines/Drains: [place]   Endocrine: No issues   Disposition: stable to be listed     --------------------------------------------------------------------------------------    Critical Care Diagnoses: SICU Daily Progress Note  =====================================================  Interval/Overnight Events:    No acute events overnight     POD # 2         	SICU Day #   1    HPI:    Pt. is a 53 yo male that had a colon resection 10/2016.  Pt. now has an incisional hernia.     PMH:      Diverticulitis large intestine    GERD (gastroesophageal reflux disease)    HTN (hypertension)    Incisional hernia    Obesity    Sleep apnea.    Past Surgical History:  S/P cholecystectomy  Laparoscopic - 2014  S/P colon resection  10/2016      Allergies: No Known Allergies      MEDICATIONS:   --------------------------------------------------------------------------------------  Neurologic Medications  HYDROmorphone  Injectable 0.5 milliGRAM(s) IV Push every 3 hours PRN Moderate Pain (4 - 6)  acetaminophen  IVPB. 1000 milliGRAM(s) IV Intermittent once  ondansetron   Disintegrating Tablet 4 milliGRAM(s) Oral every 8 hours PRN Nausea and/or Vomiting    Respiratory Medications    Cardiovascular Medications    Gastrointestinal Medications  lactated ringers. 1000 milliLiter(s) IV Continuous <Continuous>    Genitourinary Medications    Hematologic/Oncologic Medications  heparin  Injectable 5000 Unit(s) SubCutaneous every 8 hours    Antimicrobial/Immunologic Medications    Endocrine/Metabolic Medications    Topical/Other Medications    --------------------------------------------------------------------------------------    T(C): 37.4 (07-16-17 @ 04:00), Max: 37.6 (07-15-17 @ 12:00)  HR: 105 (07-16-17 @ 05:00) (90 - 107)  BP: 134/107 (07-16-17 @ 05:00) (87/56 - 135/87)  BP(mean): 113 (07-16-17 @ 05:00) (66 - 113)  ABP: --  ABP(mean): --  RR: 22 (07-16-17 @ 05:00) (0 - 33)  SpO2: 98% (07-16-17 @ 05:00) (90% - 100%)  Wt(kg): --  CVP(mm Hg): --  CI: --  CAPILLARY BLOOD GLUCOSE       N/A      07-14 @ 07:01  -  07-15 @ 07:00  --------------------------------------------------------  IN:  Total IN: 0 mL    OUT:    Bulb: 75 mL    Bulb: 67.5 mL    Indwelling Catheter - Urethral: 715 mL  Total OUT: 857.5 mL    Total NET: -857.5 mL      07-15 @ 07:01  -  07-16 @ 05:25  --------------------------------------------------------  IN:    lactated ringers.: 2500 mL  Total IN: 2500 mL    OUT:    Bulb: 90 mL    Bulb: 130 mL    Indwelling Catheter - Urethral: 1685 mL  Total OUT: 1905 mL    Total NET: 595 mL      --------------------------------------------------------------------------------------    EXAM  NEUROLOGY  RASS:   	GCS:    Exam: Normal, NAD, alert, oriented x3, no focal deficits.    HEENT  Exam: Normocephalic, atraumatic, EOMI.     RESPIRATORY  Exam: Lungs clear to auscultation, Normal expansion/effort.      CARDIOVASCULAR  Exam: S1, S2.  Regular rate and rhythm.    GI/NUTRITION  Exam: Abdomen soft, Non-tender, Non-distended.  KIMBERLY drains in place : serosanguinous output   Current Diet:  Clears     VASCULAR  Exam: Extremities warm, pink, well-perfused.    MUSCULOSKELETAL  Exam: All extremities moving spontaneously without limitations.    SKIN  Exam: Good skin turgor, no skin breakdown.     METABOLIC/FLUIDS/ELECTROLYTES  lactated ringers. 1000 milliLiter(s) IV Continuous <Continuous>      HEMATOLOGIC  [x] VTE Prophylaxis: heparin  Injectable 5000 Unit(s) SubCutaneous every 8 hours    Transfusions:	[] PRBC	[] Platelets		[] FFP	[] Cryoprecipitate    INFECTIOUS DISEASE  Antimicrobials/Immunologic Medications:    Day #      of         Tubes/Lines/Drains  ***  [x] Peripheral IV  [] Central Venous Line     	[] R	[] L	[] IJ	[] Fem	[] SC	Date Placed:   [] Arterial Line		[] R	[] L	[] Fem	[] Rad	[] Ax	Date Placed:   [] PICC		[] Midline		[] Mediport  [X] Urinary Catheter		Date Placed:   [x] Necessity of urinary, arterial, and venous catheters discussed    LABS  --------------------------------------------------------------------------------------  CBC (07-16 @ 02:51)                              10.4<L>                       11.57<H>  )--------------(  190        --    % Neuts, --    % Lymphs, ANC: --                                  29.4<L>  CBC (07-15 @ 02:00)                              14.8                         15.22<H>  )--------------(  261        --    % Neuts, --    % Lymphs, ANC: --                                  42.7      BMP (07-16 @ 02:51)             139     |  104     |  13    		Ca++ --      Ca 8.3<L>             ---------------------------------( 119<H>		Mg 1.6                4.2     |  22      |  0.72  			Ph 1.8<L>  BMP (07-15 @ 02:35)             139     |  102     |  17    		Ca++ --      Ca 8.5                ---------------------------------( 202<H>		Mg --                 4.7     |  20<L>   |  1.28  			Ph --          Coags (07-16 @ 02:51)  aPTT 28.6 / INR 1.14 / PT 12.8  Coags (07-15 @ 02:00)  aPTT 29.7 / INR 1.13 / PT 12.7      ABG (07-15 @ 06:25)      /  /  /  /  / %     Lactate:   2.4<H>  ABG (07-15 @ 02:35)      /  /  /  /  / %     Lactate:   5.6<HH>    VBG (07-15 @ 06:25)     7.43 / 38<L> / 29<L> / 24 / 0.8 / 52.6<L>%  VBG (07-15 @ 02:35)     7.29<L> / 47 / 29<L> / 20 / -3.5 / 45.5<L>%        --------------------------------------------------------------------------------------    OTHER LABORATORY:     IMAGING STUDIES:   CXR:     ASSESSMENT:  Assessment: 52 year old male who underwent a [place]-Stoppa repair of abdominal wall incisional hernia yesterday. He previously (October 2016) underwent an open low anterior resection for a perforated diverticulitis.  He subsequently developed an incisional hernia for which he underwent repair using Alessandro-Stoppa technique. The procedure last 4-5 hours, received 2.5 liters intraoperatively. He also underwent placement of a lumbar epidural catheter for patient controlled epidural analgesia. He was hypotensive overnight. The hypotensive episodes was thought to be secondary to a combination of under-resuscitation and peripheral vasodilation secondary to continuous infusion of patient controlled epidural analgesia. His serum lactate improved after fluid resuscitation.       PLAN:    Neurologic: On Dilaudid for PRN pain control   Respiratory: On nasal Cannula , sating well , wean as tolerated   Cardiovascular: No acute issues . Stable blood pressures   Gastrointestinal/Nutrition: Clear Diet , On Zofran   Renal/Genitourinary: Monitor lytes , replete as needed   Hematologic: On SQH for VTE prophylaxis   Infectious Disease: No issues  Tubes/Lines/Drains: [place]   Endocrine: No issues   Disposition: stable to be listed     --------------------------------------------------------------------------------------    Critical Care Diagnoses: SICU Daily Progress Note  =====================================================  Interval/Overnight Events:    No acute events overnight     POD # 2         	SICU Day #   1    HPI:    Pt. is a 53 yo male that had a colon resection 10/2016.  Now s/p repair of ventral incisional hernia repair with mesh , bilateral component separation c/b post op hypotension     PMH:      Diverticulitis large intestine    GERD (gastroesophageal reflux disease)    HTN (hypertension)    Incisional hernia    Obesity    Sleep apnea.    Past Surgical History:  S/P cholecystectomy  Laparoscopic - 2014  S/P colon resection  10/2016      Allergies: No Known Allergies      MEDICATIONS:   --------------------------------------------------------------------------------------  Neurologic Medications  HYDROmorphone  Injectable 0.5 milliGRAM(s) IV Push every 3 hours PRN Moderate Pain (4 - 6)  acetaminophen  IVPB. 1000 milliGRAM(s) IV Intermittent once  ondansetron   Disintegrating Tablet 4 milliGRAM(s) Oral every 8 hours PRN Nausea and/or Vomiting    Respiratory Medications    Cardiovascular Medications    Gastrointestinal Medications  lactated ringers. 1000 milliLiter(s) IV Continuous <Continuous>    Genitourinary Medications    Hematologic/Oncologic Medications  heparin  Injectable 5000 Unit(s) SubCutaneous every 8 hours    Antimicrobial/Immunologic Medications    Endocrine/Metabolic Medications    Topical/Other Medications    --------------------------------------------------------------------------------------    T(C): 37.4 (07-16-17 @ 04:00), Max: 37.6 (07-15-17 @ 12:00)  HR: 105 (07-16-17 @ 05:00) (90 - 107)  BP: 134/107 (07-16-17 @ 05:00) (87/56 - 135/87)  BP(mean): 113 (07-16-17 @ 05:00) (66 - 113)  ABP: --  ABP(mean): --  RR: 22 (07-16-17 @ 05:00) (0 - 33)  SpO2: 98% (07-16-17 @ 05:00) (90% - 100%)  Wt(kg): --  CVP(mm Hg): --  CI: --  CAPILLARY BLOOD GLUCOSE       N/A      07-14 @ 07:01  -  07-15 @ 07:00  --------------------------------------------------------  IN:  Total IN: 0 mL    OUT:    Bulb: 75 mL    Bulb: 67.5 mL    Indwelling Catheter - Urethral: 715 mL  Total OUT: 857.5 mL    Total NET: -857.5 mL      07-15 @ 07:01  -  07-16 @ 05:25  --------------------------------------------------------  IN:    lactated ringers.: 2500 mL  Total IN: 2500 mL    OUT:    Bulb: 90 mL    Bulb: 130 mL    Indwelling Catheter - Urethral: 1685 mL  Total OUT: 1905 mL    Total NET: 595 mL      --------------------------------------------------------------------------------------    EXAM  NEUROLOGY  RASS:   	GCS:    Exam: Normal, NAD, alert, oriented x3, no focal deficits.    HEENT  Exam: Normocephalic, atraumatic, EOMI.     RESPIRATORY  Exam: Lungs clear to auscultation, Normal expansion/effort.      CARDIOVASCULAR  Exam: S1, S2.  Regular rate and rhythm.    GI/NUTRITION  Exam: Abdomen soft, Non-tender, Non-distended.  KIMBERLY drains in place : serosanguinous output   Current Diet:  Clears     VASCULAR  Exam: Extremities warm, pink, well-perfused.    MUSCULOSKELETAL  Exam: All extremities moving spontaneously without limitations.    SKIN  Exam: Good skin turgor, no skin breakdown.     METABOLIC/FLUIDS/ELECTROLYTES  lactated ringers. 1000 milliLiter(s) IV Continuous <Continuous>      HEMATOLOGIC  [x] VTE Prophylaxis: heparin  Injectable 5000 Unit(s) SubCutaneous every 8 hours    Transfusions:	[] PRBC	[] Platelets		[] FFP	[] Cryoprecipitate    INFECTIOUS DISEASE  Antimicrobials/Immunologic Medications:    Day #      of         Tubes/Lines/Drains  ***  [x] Peripheral IV  [] Central Venous Line     	[] R	[] L	[] IJ	[] Fem	[] SC	Date Placed:   [] Arterial Line		[] R	[] L	[] Fem	[] Rad	[] Ax	Date Placed:   [] PICC		[] Midline		[] Mediport  [X] Urinary Catheter		Date Placed:   [x] Necessity of urinary, arterial, and venous catheters discussed    LABS  --------------------------------------------------------------------------------------  CBC (07-16 @ 02:51)                              10.4<L>                       11.57<H>  )--------------(  190        --    % Neuts, --    % Lymphs, ANC: --                                  29.4<L>  CBC (07-15 @ 02:00)                              14.8                         15.22<H>  )--------------(  261        --    % Neuts, --    % Lymphs, ANC: --                                  42.7      BMP (07-16 @ 02:51)             139     |  104     |  13    		Ca++ --      Ca 8.3<L>             ---------------------------------( 119<H>		Mg 1.6                4.2     |  22      |  0.72  			Ph 1.8<L>  BMP (07-15 @ 02:35)             139     |  102     |  17    		Ca++ --      Ca 8.5                ---------------------------------( 202<H>		Mg --                 4.7     |  20<L>   |  1.28  			Ph --          Coags (07-16 @ 02:51)  aPTT 28.6 / INR 1.14 / PT 12.8  Coags (07-15 @ 02:00)  aPTT 29.7 / INR 1.13 / PT 12.7      ABG (07-15 @ 06:25)      /  /  /  /  / %     Lactate:   2.4<H>  ABG (07-15 @ 02:35)      /  /  /  /  / %     Lactate:   5.6<HH>    VBG (07-15 @ 06:25)     7.43 / 38<L> / 29<L> / 24 / 0.8 / 52.6<L>%  VBG (07-15 @ 02:35)     7.29<L> / 47 / 29<L> / 20 / -3.5 / 45.5<L>%        --------------------------------------------------------------------------------------    OTHER LABORATORY:     IMAGING STUDIES:   CXR:     ASSESSMENT:  Assessment: 52 year old male who underwent a Alessandro-Stoppa repair of abdominal wall incisional hernia yesterday. He previously (October 2016) underwent an open low anterior resection for a perforated diverticulitis.  He subsequently developed an incisional hernia for which he underwent repair using [place]-Stoppa technique. The procedure last 4-5 hours, received 2.5 liters intraoperatively. He also underwent placement of a lumbar epidural catheter for patient controlled epidural analgesia. He was hypotensive overnight. The hypotensive episodes was thought to be secondary to a combination of under-resuscitation and peripheral vasodilation secondary to continuous infusion of patient controlled epidural analgesia. His serum lactate improved after fluid resuscitation.       PLAN:    Neurologic: On Dilaudid for PRN pain control   Respiratory: On nasal Cannula , sating well , wean as tolerated   Cardiovascular: No acute issues . Stable blood pressures   Gastrointestinal/Nutrition: Clear Diet , On Zofran   Renal/Genitourinary: Monitor lytes , replete as needed   Hematologic: On SQH for VTE prophylaxis   Infectious Disease: No issues  Tubes/Lines/Drains: [place]   Endocrine: No issues   Disposition: stable to be listed     --------------------------------------------------------------------------------------    Critical Care Diagnoses: SICU Daily Progress Note  =====================================================  Interval/Overnight Events:    No acute events overnight     POD # 2         	SICU Day #   1    HPI:    Pt. is a 51 yo male that had a colon resection 10/2016.  Now s/p repair of ventral incisional hernia repair with mesh , bilateral component separation c/b post op hypotension     PMH:      Diverticulitis large intestine    GERD (gastroesophageal reflux disease)    HTN (hypertension)    Incisional hernia    Obesity    Sleep apnea.    Past Surgical History:  S/P cholecystectomy  Laparoscopic - 2014  S/P colon resection  10/2016      Allergies: No Known Allergies      MEDICATIONS:   --------------------------------------------------------------------------------------  Neurologic Medications  HYDROmorphone  Injectable 0.5 milliGRAM(s) IV Push every 3 hours PRN Moderate Pain (4 - 6)  acetaminophen  IVPB. 1000 milliGRAM(s) IV Intermittent once  ondansetron   Disintegrating Tablet 4 milliGRAM(s) Oral every 8 hours PRN Nausea and/or Vomiting    Respiratory Medications    Cardiovascular Medications    Gastrointestinal Medications  lactated ringers. 1000 milliLiter(s) IV Continuous <Continuous>    Genitourinary Medications    Hematologic/Oncologic Medications  heparin  Injectable 5000 Unit(s) SubCutaneous every 8 hours    Antimicrobial/Immunologic Medications    Endocrine/Metabolic Medications    Topical/Other Medications    --------------------------------------------------------------------------------------    T(C): 37.4 (07-16-17 @ 04:00), Max: 37.6 (07-15-17 @ 12:00)  HR: 105 (07-16-17 @ 05:00) (90 - 107)  BP: 134/107 (07-16-17 @ 05:00) (87/56 - 135/87)  BP(mean): 113 (07-16-17 @ 05:00) (66 - 113)  ABP: --  ABP(mean): --  RR: 22 (07-16-17 @ 05:00) (0 - 33)  SpO2: 98% (07-16-17 @ 05:00) (90% - 100%)  Wt(kg): --  CVP(mm Hg): --  CI: --  CAPILLARY BLOOD GLUCOSE       N/A      07-14 @ 07:01  -  07-15 @ 07:00  --------------------------------------------------------  IN:  Total IN: 0 mL    OUT:    Bulb: 75 mL    Bulb: 67.5 mL    Indwelling Catheter - Urethral: 715 mL  Total OUT: 857.5 mL    Total NET: -857.5 mL      07-15 @ 07:01  -  07-16 @ 05:25  --------------------------------------------------------  IN:    lactated ringers.: 2500 mL  Total IN: 2500 mL    OUT:    Bulb: 90 mL    Bulb: 130 mL    Indwelling Catheter - Urethral: 1685 mL  Total OUT: 1905 mL    Total NET: 595 mL      --------------------------------------------------------------------------------------    EXAM  NEUROLOGY  RASS:   	GCS:    Exam: Normal, NAD, alert, oriented x3, no focal deficits.    HEENT  Exam: Normocephalic, atraumatic, EOMI.     RESPIRATORY  Exam: Lungs clear to auscultation, Normal expansion/effort.      CARDIOVASCULAR  Exam: S1, S2.  Regular rate and rhythm.    GI/NUTRITION  Exam: Abdomen soft, Non-tender, Non-distended.  KIMBERLY drains in place : serosanguinous output   Current Diet:  Clears     VASCULAR  Exam: Extremities warm, pink, well-perfused.    MUSCULOSKELETAL  Exam: All extremities moving spontaneously without limitations.    SKIN  Exam: Good skin turgor, no skin breakdown.     METABOLIC/FLUIDS/ELECTROLYTES  lactated ringers. 1000 milliLiter(s) IV Continuous <Continuous>      HEMATOLOGIC  [x] VTE Prophylaxis: heparin  Injectable 5000 Unit(s) SubCutaneous every 8 hours    Transfusions:	[] PRBC	[] Platelets		[] FFP	[] Cryoprecipitate    INFECTIOUS DISEASE  Antimicrobials/Immunologic Medications:    Day #      of         Tubes/Lines/Drains  ***  [x] Peripheral IV  [] Central Venous Line     	[] R	[] L	[] IJ	[] Fem	[] SC	Date Placed:   [] Arterial Line		[] R	[] L	[] Fem	[] Rad	[] Ax	Date Placed:   [] PICC		[] Midline		[] Mediport  [X] Urinary Catheter		Date Placed:   [x] Necessity of urinary, arterial, and venous catheters discussed    LABS  --------------------------------------------------------------------------------------  CBC (07-16 @ 02:51)                              10.4<L>                       11.57<H>  )--------------(  190        --    % Neuts, --    % Lymphs, ANC: --                                  29.4<L>  CBC (07-15 @ 02:00)                              14.8                         15.22<H>  )--------------(  261        --    % Neuts, --    % Lymphs, ANC: --                                  42.7      BMP (07-16 @ 02:51)             139     |  104     |  13    		Ca++ --      Ca 8.3<L>             ---------------------------------( 119<H>		Mg 1.6                4.2     |  22      |  0.72  			Ph 1.8<L>  BMP (07-15 @ 02:35)             139     |  102     |  17    		Ca++ --      Ca 8.5                ---------------------------------( 202<H>		Mg --                 4.7     |  20<L>   |  1.28  			Ph --          Coags (07-16 @ 02:51)  aPTT 28.6 / INR 1.14 / PT 12.8  Coags (07-15 @ 02:00)  aPTT 29.7 / INR 1.13 / PT 12.7      ABG (07-15 @ 06:25)      /  /  /  /  / %     Lactate:   2.4<H>  ABG (07-15 @ 02:35)      /  /  /  /  / %     Lactate:   5.6<HH>    VBG (07-15 @ 06:25)     7.43 / 38<L> / 29<L> / 24 / 0.8 / 52.6<L>%  VBG (07-15 @ 02:35)     7.29<L> / 47 / 29<L> / 20 / -3.5 / 45.5<L>%        --------------------------------------------------------------------------------------    OTHER LABORATORY:     IMAGING STUDIES:   CXR:     ASSESSMENT:  Assessment: 52 year old male who underwent a Alessandro-Stoppa repair of abdominal wall incisional hernia yesterday. He previously (October 2016) underwent an open low anterior resection for a perforated diverticulitis.  He subsequently developed an incisional hernia for which he underwent repair using [place]-Stoppa technique. The procedure last 4-5 hours, received 2.5 liters intraoperatively. He also underwent placement of a lumbar epidural catheter for patient controlled epidural analgesia. He was hypotensive overnight. The hypotensive episodes was thought to be secondary to a combination of under-resuscitation and peripheral vasodilation secondary to continuous infusion of patient controlled epidural analgesia. His serum lactate improved after fluid resuscitation.       PLAN:    Neurologic: On Dilaudid for PRN pain control   Respiratory: On nasal Cannula , sating well , wean as tolerated   Cardiovascular: No acute issues . Stable blood pressures   Gastrointestinal/Nutrition: NPO, Protonix   Renal/Genitourinary: Monitor lytes , replete as needed, MIVF  Hematologic: Serial CBC, holding SQH  Infectious Disease: No issues  Tubes/Lines/Drains: KIMBERLY [place]   Endocrine: No issues   Disposition: SICU  --------------------------------------------------------------------------------------    Critical Care Diagnoses:

## 2017-07-16 NOTE — PROGRESS NOTE ADULT - PROBLEM SELECTOR PLAN 1
- serial H/H  - NPO  - hold SQH today  - If he starts pouring out from the KIMBERLY and his H/H drops dramatically, will obtain a CTA  - SICU observation

## 2017-07-16 NOTE — PROGRESS NOTE ADULT - SUBJECTIVE AND OBJECTIVE BOX
GENERAL SURGERY PROGRESS NOTE    S/E: His H/H dropped from 14/42 to 10/29 after 4 L bolus. His L KIMBERLY output increased and turned more sanguinous than before. His BP is much better however. Reports increased pain and discomfort this morning.    MEDICATIONS  (STANDING):  acetaminophen  IVPB. 1000 milliGRAM(s) IV Intermittent once  pantoprazole  Injectable 40 milliGRAM(s) IV Push daily  dextrose 5% + sodium chloride 0.9% with potassium chloride 20 mEq/L 1000 milliLiter(s) (100 mL/Hr) IV Continuous <Continuous>    MEDICATIONS  (PRN):  HYDROmorphone  Injectable 0.5 milliGRAM(s) IV Push every 3 hours PRN Moderate Pain (4 - 6)  ondansetron   Disintegrating Tablet 4 milliGRAM(s) Oral every 8 hours PRN Nausea and/or Vomiting    Allergies    No Known Allergies    Intolerances        Vital Signs Last 24 Hrs  T(C): 36.7 (16 Jul 2017 20:00), Max: 37.6 (16 Jul 2017 00:00)  T(F): 98 (16 Jul 2017 20:00), Max: 99.6 (16 Jul 2017 00:00)  HR: 89 (16 Jul 2017 22:00) (88 - 107)  BP: 125/78 (16 Jul 2017 22:00) (96/58 - 147/77)  BP(mean): 89 (16 Jul 2017 22:00) (67 - 113)  RR: 24 (16 Jul 2017 22:00) (13 - 34)  SpO2: 95% (16 Jul 2017 22:00) (90% - 99%)  Daily     Daily     PE  mild distress from abdomen  in chair  obese panniculus  abdominal binder on  prevena vac - battery changed and started working again  KIMBERLY sanguinous L > R                          9.6    10.77 )-----------( 187      ( 16 Jul 2017 20:34 )             27.1     07-16    139  |  104  |  13  ----------------------------<  119<H>  4.2   |  22  |  0.72    Ca    8.3<L>      16 Jul 2017 02:51  Phos  1.8     07-16  Mg     1.6     07-16      PT/INR - ( 16 Jul 2017 02:51 )   PT: 12.8 SEC;   INR: 1.14          PTT - ( 16 Jul 2017 02:51 )  PTT:28.6 SEC

## 2017-07-17 LAB
BUN SERPL-MCNC: 8 MG/DL — SIGNIFICANT CHANGE UP (ref 7–23)
CA-I BLD-SCNC: 1.03 MMOL/L — SIGNIFICANT CHANGE UP (ref 1.03–1.23)
CALCIUM SERPL-MCNC: 8 MG/DL — LOW (ref 8.4–10.5)
CHLORIDE SERPL-SCNC: 108 MMOL/L — HIGH (ref 98–107)
CO2 SERPL-SCNC: 22 MMOL/L — SIGNIFICANT CHANGE UP (ref 22–31)
CREAT SERPL-MCNC: 0.64 MG/DL — SIGNIFICANT CHANGE UP (ref 0.5–1.3)
GLUCOSE SERPL-MCNC: 116 MG/DL — HIGH (ref 70–99)
HCT VFR BLD CALC: 28 % — LOW (ref 39–50)
HGB BLD-MCNC: 9.6 G/DL — LOW (ref 13–17)
MAGNESIUM SERPL-MCNC: 2 MG/DL — SIGNIFICANT CHANGE UP (ref 1.6–2.6)
MCHC RBC-ENTMCNC: 32.1 PG — SIGNIFICANT CHANGE UP (ref 27–34)
MCHC RBC-ENTMCNC: 34.3 % — SIGNIFICANT CHANGE UP (ref 32–36)
MCV RBC AUTO: 93.6 FL — SIGNIFICANT CHANGE UP (ref 80–100)
NRBC # FLD: 0.02 — SIGNIFICANT CHANGE UP
PHOSPHATE SERPL-MCNC: 1.3 MG/DL — LOW (ref 2.5–4.5)
PLATELET # BLD AUTO: 194 K/UL — SIGNIFICANT CHANGE UP (ref 150–400)
PMV BLD: 10.8 FL — SIGNIFICANT CHANGE UP (ref 7–13)
POTASSIUM SERPL-MCNC: 4.7 MMOL/L — SIGNIFICANT CHANGE UP (ref 3.5–5.3)
POTASSIUM SERPL-SCNC: 4.7 MMOL/L — SIGNIFICANT CHANGE UP (ref 3.5–5.3)
RBC # BLD: 2.99 M/UL — LOW (ref 4.2–5.8)
RBC # FLD: 13.8 % — SIGNIFICANT CHANGE UP (ref 10.3–14.5)
SODIUM SERPL-SCNC: 141 MMOL/L — SIGNIFICANT CHANGE UP (ref 135–145)
WBC # BLD: 8.38 K/UL — SIGNIFICANT CHANGE UP (ref 3.8–10.5)
WBC # FLD AUTO: 8.38 K/UL — SIGNIFICANT CHANGE UP (ref 3.8–10.5)

## 2017-07-17 PROCEDURE — 71010: CPT | Mod: 26

## 2017-07-17 PROCEDURE — 99232 SBSQ HOSP IP/OBS MODERATE 35: CPT | Mod: GC

## 2017-07-17 RX ORDER — CHLORHEXIDINE GLUCONATE 213 G/1000ML
1 SOLUTION TOPICAL ONCE
Qty: 0 | Refills: 0 | Status: DISCONTINUED | OUTPATIENT
Start: 2017-07-17 | End: 2017-07-17

## 2017-07-17 RX ORDER — AMLODIPINE BESYLATE 2.5 MG/1
5 TABLET ORAL DAILY
Qty: 0 | Refills: 0 | Status: DISCONTINUED | OUTPATIENT
Start: 2017-07-17 | End: 2017-07-18

## 2017-07-17 RX ORDER — SODIUM CHLORIDE 9 MG/ML
1000 INJECTION, SOLUTION INTRAVENOUS
Qty: 0 | Refills: 0 | Status: DISCONTINUED | OUTPATIENT
Start: 2017-07-17 | End: 2017-07-17

## 2017-07-17 RX ORDER — HEPARIN SODIUM 5000 [USP'U]/ML
5000 INJECTION INTRAVENOUS; SUBCUTANEOUS EVERY 8 HOURS
Qty: 0 | Refills: 0 | Status: DISCONTINUED | OUTPATIENT
Start: 2017-07-17 | End: 2017-07-18

## 2017-07-17 RX ADMIN — HYDROMORPHONE HYDROCHLORIDE 0.5 MILLIGRAM(S): 2 INJECTION INTRAMUSCULAR; INTRAVENOUS; SUBCUTANEOUS at 00:20

## 2017-07-17 RX ADMIN — HYDROMORPHONE HYDROCHLORIDE 0.5 MILLIGRAM(S): 2 INJECTION INTRAMUSCULAR; INTRAVENOUS; SUBCUTANEOUS at 17:37

## 2017-07-17 RX ADMIN — HYDROMORPHONE HYDROCHLORIDE 0.5 MILLIGRAM(S): 2 INJECTION INTRAMUSCULAR; INTRAVENOUS; SUBCUTANEOUS at 11:27

## 2017-07-17 RX ADMIN — DEXTROSE MONOHYDRATE, SODIUM CHLORIDE, AND POTASSIUM CHLORIDE 100 MILLILITER(S): 50; .745; 4.5 INJECTION, SOLUTION INTRAVENOUS at 09:47

## 2017-07-17 RX ADMIN — HYDROMORPHONE HYDROCHLORIDE 0.5 MILLIGRAM(S): 2 INJECTION INTRAMUSCULAR; INTRAVENOUS; SUBCUTANEOUS at 21:45

## 2017-07-17 RX ADMIN — HYDROMORPHONE HYDROCHLORIDE 0.5 MILLIGRAM(S): 2 INJECTION INTRAMUSCULAR; INTRAVENOUS; SUBCUTANEOUS at 11:12

## 2017-07-17 RX ADMIN — HEPARIN SODIUM 5000 UNIT(S): 5000 INJECTION INTRAVENOUS; SUBCUTANEOUS at 14:10

## 2017-07-17 RX ADMIN — Medication 63.75 MILLIMOLE(S): at 06:47

## 2017-07-17 RX ADMIN — HYDROMORPHONE HYDROCHLORIDE 0.5 MILLIGRAM(S): 2 INJECTION INTRAMUSCULAR; INTRAVENOUS; SUBCUTANEOUS at 07:05

## 2017-07-17 RX ADMIN — PANTOPRAZOLE SODIUM 40 MILLIGRAM(S): 20 TABLET, DELAYED RELEASE ORAL at 09:48

## 2017-07-17 RX ADMIN — HYDROMORPHONE HYDROCHLORIDE 0.5 MILLIGRAM(S): 2 INJECTION INTRAMUSCULAR; INTRAVENOUS; SUBCUTANEOUS at 17:22

## 2017-07-17 RX ADMIN — HYDROMORPHONE HYDROCHLORIDE 0.5 MILLIGRAM(S): 2 INJECTION INTRAMUSCULAR; INTRAVENOUS; SUBCUTANEOUS at 21:30

## 2017-07-17 RX ADMIN — HYDROMORPHONE HYDROCHLORIDE 0.5 MILLIGRAM(S): 2 INJECTION INTRAMUSCULAR; INTRAVENOUS; SUBCUTANEOUS at 06:50

## 2017-07-17 RX ADMIN — SODIUM CHLORIDE 75 MILLILITER(S): 9 INJECTION, SOLUTION INTRAVENOUS at 10:50

## 2017-07-17 RX ADMIN — AMLODIPINE BESYLATE 5 MILLIGRAM(S): 2.5 TABLET ORAL at 14:24

## 2017-07-17 RX ADMIN — HYDROMORPHONE HYDROCHLORIDE 0.5 MILLIGRAM(S): 2 INJECTION INTRAMUSCULAR; INTRAVENOUS; SUBCUTANEOUS at 00:35

## 2017-07-17 RX ADMIN — HEPARIN SODIUM 5000 UNIT(S): 5000 INJECTION INTRAVENOUS; SUBCUTANEOUS at 21:33

## 2017-07-17 NOTE — PROGRESS NOTE ADULT - SUBJECTIVE AND OBJECTIVE BOX
SICU Daily Progress Note  =====================================================  Interval/Overnight Events:    No acute events overnight     POD # 3         	SICU Day #   2    HPI:    Pt. is a 51 yo male that had a colon resection 10/2016.  Now s/p repair of ventral incisional hernia repair with mesh , bilateral component separation c/b post op hypotension     PMH:      Diverticulitis large intestine    GERD (gastroesophageal reflux disease)    HTN (hypertension)    Incisional hernia    Obesity    Sleep apnea.    Past Surgical History:  S/P cholecystectomy  Laparoscopic - 2014  S/P colon resection  10/2016      Allergies: No Known Allergies      MEDICATIONS:   --------------------------------------------------------------------------------------  Neurologic Medications  HYDROmorphone  Injectable 0.5 milliGRAM(s) IV Push every 3 hours PRN Moderate Pain (4 - 6)  acetaminophen  IVPB. 1000 milliGRAM(s) IV Intermittent once  ondansetron   Disintegrating Tablet 4 milliGRAM(s) Oral every 8 hours PRN Nausea and/or Vomiting    Respiratory Medications    Cardiovascular Medications    Gastrointestinal Medications  pantoprazole  Injectable 40 milliGRAM(s) IV Push daily  dextrose 5% + sodium chloride 0.9% with potassium chloride 20 mEq/L 1000 milliLiter(s) IV Continuous <Continuous>    Genitourinary Medications    Hematologic/Oncologic Medications    Antimicrobial/Immunologic Medications    Endocrine/Metabolic Medications    Topical/Other Medications    --------------------------------------------------------------------------------------    VITAL SIGNS, INS/OUTS (last 24 hours):  --------------------------------------------------------------------------------------  ((Insert SICU Vitals/Is+Os here))***  --------------------------------------------------------------------------------------    EXAM  NEUROLOGY  RASS:   0	GCS:    Exam: Normal, NAD, alert, oriented x3, no focal deficits.    HEENT  Exam: Normocephalic, atraumatic, EOMI.     RESPIRATORY  Exam: Lungs clear to auscultation, Normal expansion/effort.      CARDIOVASCULAR  Exam: S1, S2.  Regular rate and rhythm.    GI/NUTRITION  Exam: Abdomen soft, appropriately tender, Non-distended.  KIMBERLY drains in place x2 : serosanguinous output (sang>serous); perveena on and working, abdominal binder in place  Current Diet:  NPO w/ meds    VASCULAR  Exam: Extremities warm, pink, well-perfused.    MUSCULOSKELETAL  Exam: All extremities moving spontaneously without limitations.    SKIN  Exam: Good skin turgor, no skin breakdown.     METABOLIC/FLUIDS/ELECTROLYTES  dextrose 5% + sodium chloride 0.9% with potassium chloride 20 mEq/L 1000 milliLiter(s) IV Continuous <Continuous>      HEMATOLOGIC  [x] VTE Prophylaxis:   Transfusions:	[] PRBC	[] Platelets		[] FFP	[] Cryoprecipitate    INFECTIOUS DISEASE  Antimicrobials/Immunologic Medications:    Day #      of     ***    Tubes/Lines/Drains  ***  [x] Peripheral IV  [] Central Venous Line     	[] R	[] L	[] IJ	[] Fem	[] SC	Date Placed:   [] Arterial Line		[] R	[] L	[] Fem	[] Rad	[] Ax	Date Placed:   [] PICC		[] Midline		[] Mediport  [x] Urinary Catheter		Date Placed: 7/14/17  [x] Necessity of urinary, arterial, and venous catheters discussed    LABS  --------------------------------------------------------------------------------------  ((Insert SICU Labs here))***  --------------------------------------------------------------------------------------    OTHER LABORATORY:     IMAGING STUDIES:   CXR:     ASSESSMENT:  Assessment: 52 year old male who underwent a [place] repair of abdominal wall incisional hernia yesterday. He previously (October 2016) underwent an open low anterior resection for a perforated diverticulitis.  He subsequently developed an incisional hernia for which he underwent repair using [place]-Stoppa technique. The procedure last 4-5 hours, received 2.5 liters intraoperatively. He also underwent placement of a lumbar epidural catheter for patient controlled epidural analgesia. He was hypotensive overnight. The hypotensive episodes was thought to be secondary to a combination of under-resuscitation and peripheral vasodilation secondary to continuous infusion of patient controlled epidural analgesia. His serum lactate improved after fluid resuscitation.       PLAN:    Neurologic: On Dilaudid for PRN pain control   Respiratory: On nasal Cannula , sating well , wean as tolerated   Cardiovascular: No acute issues . Stable blood pressures   Gastrointestinal/Nutrition: NPO, Protonix   Renal/Genitourinary: Monitor lytes , replete as needed, MIVF  Hematologic: Serial CBC, holding SQH, monitor drain output  Infectious Disease: No issues  Tubes/Lines/Drains: KIMBERLY [place],   Endocrine: No issues   Disposition: SICU  --------------------------------------------------------------------------------------    Critical Care Diagnoses:

## 2017-07-17 NOTE — PROGRESS NOTE ADULT - ATTENDING COMMENTS
I have personally interviewed and examined this patient, reviewed pertinent labs and discussed the case with residents on B Team rounds.  See their adjacent note for details.    The active care issues are:  1. hypotension, improving  2. anemia    If surgical bed jps continue with high output and hct further downtrends then will obtain CTA to assess for source of ongoing bleeding.  Given restoration of BP, I hope things equilibrate shortly.
Patient examined on am multidisciplinary rounds with the SICU team.  Relevant studies reviewed - d/w Dr. Mckay.  Agree with above.      Issues:  Acute blood loss as a cause of post operative anemia:  s/p component separation repair of ventral/incisional hernia.  Bled post operatively and transfused.  In unit for monitoring.  Hemodynamically stable no current hypotension, though he was transiently hypotensive before transfer to SICU.  Acute kidney injury - improving, most recent creatinine down to normal.                                              9.6                   Neurophils% (auto):   x      (07-17 @ 04:50):    8.38 )-----------(194          Lymphocytes% (auto):  x                                             28.0                   Eosinphils% (auto):   x        Manual%: Neutrophils x    ; Lymphocytes x    ; Eosinophils x    ; Bands%: x    ; Blasts x          07-17    141  |  108<H>  |  8   ----------------------------<  116<H>  4.7   |  22  |  0.64    Ca    8.0<L>      17 Jul 2017 04:50  Phos  1.3     07-17  Mg     2.0     07-17          VBG - ( 16 Jul 2017 11:48 )  pH: 7.41  /  pCO2: 43    /  pO2: 25    / HCO3: 25    / Base Excess: 2.0   /  SvO2: 36.2  / Lactate: 1.8      RECENT CULTURES:    Post GI surgery ileus appears to be resolving.  Will begin clears.  OK for transfer to floor.
s/p incisional hernia repair with abdominal wall reconstruction  -Trend hct  -OOB  -Clears to regular as tolerated (+bm and flatus)  -DVT ppx
Cross-coverage: I have personally interviewed and examined this patient, reviewed pertinent labs and imaging, and discussed the case with colleagues, residents, and physician assistants on B Team rounds.  Appreciate pain service f/u.    The active care issues are:  1. hypotension with JAYME and lactic acidosis    Resolved with fluid administration and discontinuation of the epidural PCA.  Will monitor hematocrit.  May trial clear liquids by mouth when BP normalizes.
dx:  D64.9 Anemia, unspecified type   - gross blood in both KIMBERLY drains, but pt HD stable and H/H currently stable (dropped overnight after receiving several liters of crystalloid)  - serial H/H, serial abd exams  G47.33 Obstructive sleep apnea syndrome   - pt reports not using his CPAP machine at home. No resp issues overnight  Z91.89 At risk for malnutrition   - Diet on hold until H/H stable.  on D5 IV while NPO

## 2017-07-17 NOTE — PROGRESS NOTE ADULT - SUBJECTIVE AND OBJECTIVE BOX
GENERAL SURGERY PROGRESS NOTE            Patient is a 52y old  Male who presents with a chief complaint of "they're going to repair my surgical hernia" (11 Jul 2017 09:33)      SUBJECTIVE: Pt seen and examined at bedside . No acute issues overnight. pain controlled, no nausea/vomiting/headache/dizziness/chest pain/shortness of breath          OBJECTIVE:  PHYSICAL EXAM:  GA: NAD  Abdomen:  -  soft, distended, pervena vac in place and working ,  - b/l KIMBERLY  draining serosanguinous output     Vitals/Labs:  Vital Signs Last 24 Hrs  T(C): 37 (17 Jul 2017 08:00), Max: 37.3 (16 Jul 2017 12:00)  T(F): 98.6 (17 Jul 2017 08:00), Max: 99.2 (16 Jul 2017 12:00)  HR: 86 (17 Jul 2017 10:00) (79 - 107)  BP: 140/89 (17 Jul 2017 09:00) (105/83 - 144/91)  BP(mean): 101 (17 Jul 2017 09:00) (72 - 112)  RR: 29 (17 Jul 2017 10:00) (13 - 34)  SpO2: 98% (17 Jul 2017 10:00) (94% - 100%)    I&O's Detail    16 Jul 2017 07:01  -  17 Jul 2017 07:00  --------------------------------------------------------  IN:    dextrose 5% + sodium chloride 0.9% with potassium chloride 20 mEq/L: 2200 mL    lactated ringers.: 250 mL  Total IN: 2450 mL    OUT:    Bulb: 100 mL    Bulb: 140 mL    Indwelling Catheter - Urethral: 2540 mL  Total OUT: 2780 mL    Total NET: -330 mL      17 Jul 2017 07:01  -  17 Jul 2017 10:57  --------------------------------------------------------  IN:    dextrose 5% + sodium chloride 0.9% with potassium chloride 20 mEq/L: 300 mL  Total IN: 300 mL    OUT:    Bulb: 15 mL    Bulb: 45 mL    Indwelling Catheter - Urethral: 425 mL  Total OUT: 485 mL    Total NET: -185 mL                                9.6    8.38  )-----------( 194      ( 17 Jul 2017 04:50 )             28.0       07-17    141  |  108<H>  |  8   ----------------------------<  116<H>  4.7   |  22  |  0.64    Ca    8.0<L>      17 Jul 2017 04:50  Phos  1.3     07-17  Mg     2.0     07-17        CAPILLARY BLOOD GLUCOSE              PT/INR - ( 16 Jul 2017 02:51 )   PT: 12.8 SEC;   INR: 1.14          PTT - ( 16 Jul 2017 02:51 )  PTT:28.6 SEC        ASSESSMENT/PLAN: FLO GALEANO 52y Male s/p    - Diet: CLears   - Pain control   - Input and outputs   - DVT ppx- restart SQH  - OOB/incentive spirometry   -Can be listed to the floor     MEDICATIONS  (STANDING):  acetaminophen  IVPB. 1000 milliGRAM(s) IV Intermittent once  pantoprazole  Injectable 40 milliGRAM(s) IV Push daily  chlorhexidine 4% Liquid 1 Application(s) Topical once  dextrose 5% + sodium chloride 0.45%. 1000 milliLiter(s) (75 mL/Hr) IV Continuous <Continuous>  heparin  Injectable 5000 Unit(s) SubCutaneous every 8 hours    MEDICATIONS  (PRN):  HYDROmorphone  Injectable 0.5 milliGRAM(s) IV Push every 3 hours PRN Moderate Pain (4 - 6)  ondansetron   Disintegrating Tablet 4 milliGRAM(s) Oral every 8 hours PRN Nausea and/or Vomiting      B team  [id number]

## 2017-07-18 ENCOUNTER — TRANSCRIPTION ENCOUNTER (OUTPATIENT)
Age: 52
End: 2017-07-18

## 2017-07-18 VITALS
TEMPERATURE: 99 F | SYSTOLIC BLOOD PRESSURE: 123 MMHG | DIASTOLIC BLOOD PRESSURE: 74 MMHG | RESPIRATION RATE: 18 BRPM | OXYGEN SATURATION: 100 % | HEART RATE: 77 BPM

## 2017-07-18 LAB
ALBUMIN SERPL ELPH-MCNC: 3.1 G/DL — LOW (ref 3.3–5)
ALP SERPL-CCNC: 50 U/L — SIGNIFICANT CHANGE UP (ref 40–120)
ALT FLD-CCNC: 10 U/L — SIGNIFICANT CHANGE UP (ref 4–41)
APTT BLD: 28.1 SEC — SIGNIFICANT CHANGE UP (ref 27.5–37.4)
AST SERPL-CCNC: 19 U/L — SIGNIFICANT CHANGE UP (ref 4–40)
BASOPHILS # BLD AUTO: 0.07 K/UL — SIGNIFICANT CHANGE UP (ref 0–0.2)
BASOPHILS NFR BLD AUTO: 0.8 % — SIGNIFICANT CHANGE UP (ref 0–2)
BILIRUB SERPL-MCNC: 0.7 MG/DL — SIGNIFICANT CHANGE UP (ref 0.2–1.2)
BUN SERPL-MCNC: 12 MG/DL — SIGNIFICANT CHANGE UP (ref 7–23)
CALCIUM SERPL-MCNC: 8.7 MG/DL — SIGNIFICANT CHANGE UP (ref 8.4–10.5)
CHLORIDE SERPL-SCNC: 105 MMOL/L — SIGNIFICANT CHANGE UP (ref 98–107)
CO2 SERPL-SCNC: 22 MMOL/L — SIGNIFICANT CHANGE UP (ref 22–31)
CREAT SERPL-MCNC: 0.69 MG/DL — SIGNIFICANT CHANGE UP (ref 0.5–1.3)
EOSINOPHIL # BLD AUTO: 0.44 K/UL — SIGNIFICANT CHANGE UP (ref 0–0.5)
EOSINOPHIL NFR BLD AUTO: 5.2 % — SIGNIFICANT CHANGE UP (ref 0–6)
GLUCOSE SERPL-MCNC: 99 MG/DL — SIGNIFICANT CHANGE UP (ref 70–99)
HCT VFR BLD CALC: 28.9 % — LOW (ref 39–50)
HGB BLD-MCNC: 10.2 G/DL — LOW (ref 13–17)
IMM GRANULOCYTES # BLD AUTO: 0.12 # — SIGNIFICANT CHANGE UP
IMM GRANULOCYTES NFR BLD AUTO: 1.4 % — SIGNIFICANT CHANGE UP (ref 0–1.5)
INR BLD: 1.03 — SIGNIFICANT CHANGE UP (ref 0.88–1.17)
LYMPHOCYTES # BLD AUTO: 2.47 K/UL — SIGNIFICANT CHANGE UP (ref 1–3.3)
LYMPHOCYTES # BLD AUTO: 29 % — SIGNIFICANT CHANGE UP (ref 13–44)
MAGNESIUM SERPL-MCNC: 1.9 MG/DL — SIGNIFICANT CHANGE UP (ref 1.6–2.6)
MCHC RBC-ENTMCNC: 33.2 PG — SIGNIFICANT CHANGE UP (ref 27–34)
MCHC RBC-ENTMCNC: 35.3 % — SIGNIFICANT CHANGE UP (ref 32–36)
MCV RBC AUTO: 94.1 FL — SIGNIFICANT CHANGE UP (ref 80–100)
MONOCYTES # BLD AUTO: 0.77 K/UL — SIGNIFICANT CHANGE UP (ref 0–0.9)
MONOCYTES NFR BLD AUTO: 9 % — SIGNIFICANT CHANGE UP (ref 2–14)
NEUTROPHILS # BLD AUTO: 4.66 K/UL — SIGNIFICANT CHANGE UP (ref 1.8–7.4)
NEUTROPHILS NFR BLD AUTO: 54.6 % — SIGNIFICANT CHANGE UP (ref 43–77)
NRBC # FLD: 0.04 — SIGNIFICANT CHANGE UP
PHOSPHATE SERPL-MCNC: 2 MG/DL — LOW (ref 2.5–4.5)
PLATELET # BLD AUTO: 232 K/UL — SIGNIFICANT CHANGE UP (ref 150–400)
PMV BLD: 10.6 FL — SIGNIFICANT CHANGE UP (ref 7–13)
POTASSIUM SERPL-MCNC: 3.9 MMOL/L — SIGNIFICANT CHANGE UP (ref 3.5–5.3)
POTASSIUM SERPL-SCNC: 3.9 MMOL/L — SIGNIFICANT CHANGE UP (ref 3.5–5.3)
PROT SERPL-MCNC: 6.3 G/DL — SIGNIFICANT CHANGE UP (ref 6–8.3)
PROTHROM AB SERPL-ACNC: 11.6 SEC — SIGNIFICANT CHANGE UP (ref 9.8–13.1)
RBC # BLD: 3.07 M/UL — LOW (ref 4.2–5.8)
RBC # FLD: 13.4 % — SIGNIFICANT CHANGE UP (ref 10.3–14.5)
SODIUM SERPL-SCNC: 139 MMOL/L — SIGNIFICANT CHANGE UP (ref 135–145)
WBC # BLD: 8.53 K/UL — SIGNIFICANT CHANGE UP (ref 3.8–10.5)
WBC # FLD AUTO: 8.53 K/UL — SIGNIFICANT CHANGE UP (ref 3.8–10.5)

## 2017-07-18 RX ORDER — DOCUSATE SODIUM 100 MG
1 CAPSULE ORAL
Qty: 90 | Refills: 0 | OUTPATIENT
Start: 2017-07-18 | End: 2017-08-17

## 2017-07-18 RX ADMIN — HYDROMORPHONE HYDROCHLORIDE 0.5 MILLIGRAM(S): 2 INJECTION INTRAMUSCULAR; INTRAVENOUS; SUBCUTANEOUS at 17:03

## 2017-07-18 RX ADMIN — HYDROMORPHONE HYDROCHLORIDE 0.5 MILLIGRAM(S): 2 INJECTION INTRAMUSCULAR; INTRAVENOUS; SUBCUTANEOUS at 15:32

## 2017-07-18 RX ADMIN — AMLODIPINE BESYLATE 5 MILLIGRAM(S): 2.5 TABLET ORAL at 05:55

## 2017-07-18 RX ADMIN — HYDROMORPHONE HYDROCHLORIDE 0.5 MILLIGRAM(S): 2 INJECTION INTRAMUSCULAR; INTRAVENOUS; SUBCUTANEOUS at 13:17

## 2017-07-18 RX ADMIN — HYDROMORPHONE HYDROCHLORIDE 0.5 MILLIGRAM(S): 2 INJECTION INTRAMUSCULAR; INTRAVENOUS; SUBCUTANEOUS at 05:55

## 2017-07-18 RX ADMIN — HYDROMORPHONE HYDROCHLORIDE 0.5 MILLIGRAM(S): 2 INJECTION INTRAMUSCULAR; INTRAVENOUS; SUBCUTANEOUS at 12:14

## 2017-07-18 RX ADMIN — PANTOPRAZOLE SODIUM 40 MILLIGRAM(S): 20 TABLET, DELAYED RELEASE ORAL at 10:07

## 2017-07-18 RX ADMIN — HEPARIN SODIUM 5000 UNIT(S): 5000 INJECTION INTRAVENOUS; SUBCUTANEOUS at 05:55

## 2017-07-18 RX ADMIN — HEPARIN SODIUM 5000 UNIT(S): 5000 INJECTION INTRAVENOUS; SUBCUTANEOUS at 13:48

## 2017-07-18 RX ADMIN — HYDROMORPHONE HYDROCHLORIDE 0.5 MILLIGRAM(S): 2 INJECTION INTRAMUSCULAR; INTRAVENOUS; SUBCUTANEOUS at 01:21

## 2017-07-18 RX ADMIN — HYDROMORPHONE HYDROCHLORIDE 0.5 MILLIGRAM(S): 2 INJECTION INTRAMUSCULAR; INTRAVENOUS; SUBCUTANEOUS at 06:10

## 2017-07-18 RX ADMIN — HYDROMORPHONE HYDROCHLORIDE 0.5 MILLIGRAM(S): 2 INJECTION INTRAMUSCULAR; INTRAVENOUS; SUBCUTANEOUS at 02:15

## 2017-07-18 NOTE — DISCHARGE NOTE ADULT - PATIENT PORTAL LINK FT
“You can access the [employer] Patient Portal, offered by [facility], by registering with the following website: [email or web address]”

## 2017-07-18 NOTE — DISCHARGE NOTE ADULT - SECONDARY DIAGNOSIS.
Essential hypertension Diverticulitis of large intestine without perforation or abscess without bleeding Gastroesophageal reflux disease, esophagitis presence not specified

## 2017-07-18 NOTE — DISCHARGE NOTE ADULT - ADDITIONAL INSTRUCTIONS
Drains: You have two drains. Nurse has shown you how to remove sanguinous fluid from drain. After dumping fluid out, remember to squeeze drain bulb and then recap to make negative pressure in the bulb to drain out fluid.   WOUND CARE:  Please keep incisions clean and dry. Please do not Scrub or rub incisions. Do not use lotion or powder on incisions.   BATHING: Please do not submerge wound underwater. You may shower and/or sponge bathe.  ACTIVITY: No heavy lifting or straining. Otherwise, you may return to your usual level of physical activity. If you are taking narcotic pain medication (such as Percocet) DO NOT drive a car, operate machinery or make important decisions.  DIET: Return to your usual diet.  NOTIFY YOUR SURGEON IF: You have any bleeding that does not stop, any pus draining from your wound(s), any fever (over 100.4 F) or chills, persistent nausea/vomiting, persistent diarrhea, or if your pain is not controlled on your discharge pain medications.  FOLLOW-UP: Please follow up with your primary care physician in one week regarding your hospitalization. Please follow-up with your surgeon Dr. Mckay, within 7 days following discharge- please call to schedule an appointment.

## 2017-07-18 NOTE — DISCHARGE NOTE ADULT - CARE PLAN
Principal Discharge DX:	Incisional hernia, without obstruction or gangrene  Goal:	Surgery, reason for admission  Instructions for follow-up, activity and diet:	Please see wound care instructions below. Of note, drains will be kept in until Dr. Mckay appointment. If drains become full, empty the bulb drain out, squeeze after empty and then recap to create suction.  Secondary Diagnosis:	Essential hypertension  Goal:	Continue home medications as managed by your primary care physician.  Secondary Diagnosis:	Diverticulitis of large intestine without perforation or abscess without bleeding  Goal:	Resolved  Secondary Diagnosis:	Gastroesophageal reflux disease, esophagitis presence not specified  Goal:	Continue to take your protonix medication.

## 2017-07-18 NOTE — DISCHARGE NOTE ADULT - MEDICATION SUMMARY - MEDICATIONS TO TAKE
I will START or STAY ON the medications listed below when I get home from the hospital:    ibuprofen 400 mg oral tablet  -- 1 tab(s) by mouth every 6 hours, As needed, moderate pain last dose 2 weeks ago  -- Indication: For pain control    Percocet 5/325 oral tablet  -- 1 tab(s) by mouth every 6 hours MDD:4  -- Caution federal law prohibits the transfer of this drug to any person other  than the person for whom it was prescribed.  May cause drowsiness.  Alcohol may intensify this effect.  Use care when operating dangerous machinery.  This prescription cannot be refilled.  This product contains acetaminophen.  Do not use  with any other product containing acetaminophen to prevent possible liver damage.  Using more of this medication than prescribed may cause serious breathing problems.    -- Indication: For moderate to severe pain    amLODIPine 5 mg oral tablet  -- 1 tab(s) by mouth once a day - AM  -- Indication: For continue for hypertension    Colace 100 mg oral capsule  -- 1 cap(s) by mouth 3 times a day  -- Medication should be taken with plenty of water.    -- Indication: For bowel regimen for constipation    pantoprazole 40 mg oral delayed release tablet  -- 1 tab(s) by mouth once a day am  -- Indication: For GERD - reflux

## 2017-07-18 NOTE — DISCHARGE NOTE ADULT - PLAN OF CARE
Surgery, reason for admission Please see wound care instructions below. Of note, drains will be kept in until Dr. Mckay appointment. If drains become full, empty the bulb drain out, squeeze after empty and then recap to create suction. Continue home medications as managed by your primary care physician. Resolved Continue to take your protonix medication.

## 2017-07-18 NOTE — PROGRESS NOTE ADULT - PROVIDER SPECIALTY LIST ADULT
Anesthesia
Pain Medicine
SICU
SICU
Surgery
Anesthesia

## 2017-07-18 NOTE — PROGRESS NOTE ADULT - SUBJECTIVE AND OBJECTIVE BOX
B team surgery progress note    No acute events overnight. Passing flatus and stool. OOB. Pain controlled. Tolerated clears yesterday. No nausea, no vomiting. No dizziness or light headedness    ICU Vital Signs Last 24 Hrs  T(C): 36.8 (18 Jul 2017 10:24), Max: 37.4 (17 Jul 2017 16:00)  T(F): 98.2 (18 Jul 2017 10:24), Max: 99.3 (17 Jul 2017 16:00)  HR: 94 (18 Jul 2017 10:24) (84 - 98)  BP: 137/79 (18 Jul 2017 10:24) (120/74 - 153/100)  BP(mean): 102 (17 Jul 2017 20:00) (94 - 109)  RR: 18 (18 Jul 2017 10:24) (16 - 24)  SpO2: 100% (18 Jul 2017 10:24) (96% - 100%)    PE:  Vitals: Afebrile, VSS  GEN: NAD  Abd: Soft, non-tender, non-distended. Sanguinous drainage 195                          10.2   8.53  )-----------( 232      ( 18 Jul 2017 05:55 )             28.9     07-18    139  |  105  |  12  ----------------------------<  99  3.9   |  22  |  0.69    Ca    8.7      18 Jul 2017 05:55  Phos  2.0     07-18  Mg     1.9     07-18    TPro  6.3  /  Alb  3.1<L>  /  TBili  0.7  /  DBili  x   /  AST  19  /  ALT  10  /  AlkPhos  50  07-18    PT/INR - ( 18 Jul 2017 05:55 )   PT: 11.6 SEC;   INR: 1.03          PTT - ( 18 Jul 2017 05:55 )  PTT:28.1 SEC  I&O's Detail    17 Jul 2017 07:01  -  18 Jul 2017 07:00  --------------------------------------------------------  IN:    dextrose 5% + sodium chloride 0.45%.: 600 mL    dextrose 5% + sodium chloride 0.9% with potassium chloride 20 mEq/L: 300 mL    Oral Fluid: 300 mL  Total IN: 1200 mL    OUT:    Bulb: 150 mL    Bulb: 85 mL    Indwelling Catheter - Urethral: 1445 mL    Voided: 1100 mL  Total OUT: 2780 mL    Total NET: -1580 mL      18 Jul 2017 07:01  -  18 Jul 2017 13:23  --------------------------------------------------------  IN:  Total IN: 0 mL    OUT:    Bulb: 30 mL    Bulb: 15 mL    Voided: 300 mL  Total OUT: 345 mL    Total NET: -345 mL        MEDICATIONS  (STANDING):  acetaminophen  IVPB. 1000 milliGRAM(s) IV Intermittent once  pantoprazole  Injectable 40 milliGRAM(s) IV Push daily  heparin  Injectable 5000 Unit(s) SubCutaneous every 8 hours  amLODIPine   Tablet 5 milliGRAM(s) Oral daily    MEDICATIONS  (PRN):  HYDROmorphone  Injectable 0.5 milliGRAM(s) IV Push every 3 hours PRN Moderate Pain (4 - 6)  ondansetron   Disintegrating Tablet 4 milliGRAM(s) Oral every 8 hours PRN Nausea and/or Vomiting

## 2017-07-18 NOTE — PROGRESS NOTE ADULT - ASSESSMENT
52 M s/p incisional hernia repair, admitted to SICU for shock secondary to PCEA and under-resuscitation, now back on floor tolerating clear liquids, ambulating, voiding, passing flatus. Patient will be transitioned to regular diet today and PO pain meds. He will be discharged today if tolerates.

## 2017-07-18 NOTE — DISCHARGE NOTE ADULT - CARE PROVIDER_API CALL
Norberto Mckay (MD), ColonRectal Surgery; Surgery  [facility] [address] [place] [id number]  Phone: [phone]  Fax: [phone]

## 2017-07-18 NOTE — DISCHARGE NOTE ADULT - HOSPITAL COURSE
Patient was admitted after an incisional hernia repair procedure was performed on 7/14. Subsequent to operation, patient was nauseous and light-headed. He became hypotensive and required 2 liter bolus of lactated ringers to help counteract the volume depletion he experienced from the 6 hour operation. Patient was admitted to the surgical ICU where his fluids and electrolytes were optimized. Patient returned to the floor where he tolerated clear liquid and regular diets. He continues to ambulate and have gastrointestinal function (flatus and stool). Patient is ready for discharge home with oral pain medications. He will leave the hospital with two drains that will be removed by Dr. Mckay's team during the patient's first post-operative visit.

## 2017-07-24 LAB — SURGICAL PATHOLOGY STUDY: SIGNIFICANT CHANGE UP

## 2017-07-25 ENCOUNTER — LABORATORY RESULT (OUTPATIENT)
Age: 52
End: 2017-07-25

## 2017-07-25 ENCOUNTER — APPOINTMENT (OUTPATIENT)
Dept: COLORECTAL SURGERY | Facility: CLINIC | Age: 52
End: 2017-07-25

## 2017-07-25 VITALS
RESPIRATION RATE: 16 BRPM | SYSTOLIC BLOOD PRESSURE: 130 MMHG | OXYGEN SATURATION: 100 % | HEART RATE: 106 BPM | DIASTOLIC BLOOD PRESSURE: 98 MMHG | TEMPERATURE: 97.7 F

## 2017-08-01 ENCOUNTER — APPOINTMENT (OUTPATIENT)
Dept: COLORECTAL SURGERY | Facility: CLINIC | Age: 52
End: 2017-08-01
Payer: COMMERCIAL

## 2017-08-01 PROCEDURE — 99024 POSTOP FOLLOW-UP VISIT: CPT

## 2017-08-08 ENCOUNTER — APPOINTMENT (OUTPATIENT)
Dept: COLORECTAL SURGERY | Facility: CLINIC | Age: 52
End: 2017-08-08
Payer: COMMERCIAL

## 2017-08-08 ENCOUNTER — APPOINTMENT (OUTPATIENT)
Dept: CT IMAGING | Facility: CLINIC | Age: 52
End: 2017-08-08
Payer: COMMERCIAL

## 2017-08-08 ENCOUNTER — OUTPATIENT (OUTPATIENT)
Dept: OUTPATIENT SERVICES | Facility: HOSPITAL | Age: 52
LOS: 1 days | End: 2017-08-08
Payer: COMMERCIAL

## 2017-08-08 DIAGNOSIS — Z90.49 ACQUIRED ABSENCE OF OTHER SPECIFIED PARTS OF DIGESTIVE TRACT: Chronic | ICD-10-CM

## 2017-08-08 DIAGNOSIS — Z00.8 ENCOUNTER FOR OTHER GENERAL EXAMINATION: ICD-10-CM

## 2017-08-08 PROCEDURE — 74177 CT ABD & PELVIS W/CONTRAST: CPT | Mod: 26

## 2017-08-08 PROCEDURE — 99024 POSTOP FOLLOW-UP VISIT: CPT

## 2017-08-08 PROCEDURE — 74177 CT ABD & PELVIS W/CONTRAST: CPT

## 2017-08-08 PROCEDURE — 82565 ASSAY OF CREATININE: CPT

## 2017-08-10 ENCOUNTER — FORM ENCOUNTER (OUTPATIENT)
Age: 52
End: 2017-08-10

## 2017-08-11 ENCOUNTER — OUTPATIENT (OUTPATIENT)
Dept: OUTPATIENT SERVICES | Facility: HOSPITAL | Age: 52
LOS: 1 days | End: 2017-08-11
Payer: COMMERCIAL

## 2017-08-11 DIAGNOSIS — Z90.49 ACQUIRED ABSENCE OF OTHER SPECIFIED PARTS OF DIGESTIVE TRACT: Chronic | ICD-10-CM

## 2017-08-11 DIAGNOSIS — K43.2 INCISIONAL HERNIA WITHOUT OBSTRUCTION OR GANGRENE: ICD-10-CM

## 2017-08-11 PROCEDURE — 10160 PNXR ASPIR ABSC HMTMA BULLA: CPT

## 2017-08-11 PROCEDURE — 76942 ECHO GUIDE FOR BIOPSY: CPT | Mod: 26

## 2017-08-16 DIAGNOSIS — L76.32 POSTPROCEDURAL HEMATOMA OF SKIN AND SUBCUTANEOUS TISSUE FOLLOWING OTHER PROCEDURE: ICD-10-CM

## 2017-08-22 ENCOUNTER — APPOINTMENT (OUTPATIENT)
Dept: COLORECTAL SURGERY | Facility: CLINIC | Age: 52
End: 2017-08-22
Payer: COMMERCIAL

## 2017-08-22 VITALS
HEART RATE: 85 BPM | SYSTOLIC BLOOD PRESSURE: 128 MMHG | OXYGEN SATURATION: 100 % | DIASTOLIC BLOOD PRESSURE: 96 MMHG | TEMPERATURE: 98.2 F

## 2017-08-22 PROCEDURE — 99024 POSTOP FOLLOW-UP VISIT: CPT

## 2017-09-05 ENCOUNTER — APPOINTMENT (OUTPATIENT)
Dept: COLORECTAL SURGERY | Facility: CLINIC | Age: 52
End: 2017-09-05
Payer: COMMERCIAL

## 2017-09-05 PROCEDURE — 99024 POSTOP FOLLOW-UP VISIT: CPT

## 2017-09-26 ENCOUNTER — APPOINTMENT (OUTPATIENT)
Dept: COLORECTAL SURGERY | Facility: CLINIC | Age: 52
End: 2017-09-26
Payer: COMMERCIAL

## 2017-09-26 PROCEDURE — 99024 POSTOP FOLLOW-UP VISIT: CPT

## 2017-10-16 ENCOUNTER — RX RENEWAL (OUTPATIENT)
Age: 52
End: 2017-10-16

## 2017-10-24 ENCOUNTER — APPOINTMENT (OUTPATIENT)
Dept: COLORECTAL SURGERY | Facility: CLINIC | Age: 52
End: 2017-10-24
Payer: COMMERCIAL

## 2017-10-24 DIAGNOSIS — R55 SYNCOPE AND COLLAPSE: ICD-10-CM

## 2017-10-24 PROCEDURE — 99213 OFFICE O/P EST LOW 20 MIN: CPT

## 2018-01-31 ENCOUNTER — APPOINTMENT (OUTPATIENT)
Dept: INTERNAL MEDICINE | Facility: CLINIC | Age: 53
End: 2018-01-31
Payer: COMMERCIAL

## 2018-01-31 ENCOUNTER — LABORATORY RESULT (OUTPATIENT)
Age: 53
End: 2018-01-31

## 2018-01-31 VITALS
WEIGHT: 215 LBS | BODY MASS INDEX: 32.58 KG/M2 | SYSTOLIC BLOOD PRESSURE: 122 MMHG | OXYGEN SATURATION: 98 % | DIASTOLIC BLOOD PRESSURE: 80 MMHG | HEART RATE: 76 BPM | TEMPERATURE: 97.7 F | RESPIRATION RATE: 16 BRPM | HEIGHT: 68 IN

## 2018-01-31 PROCEDURE — 99396 PREV VISIT EST AGE 40-64: CPT

## 2018-01-31 RX ORDER — OXYCODONE AND ACETAMINOPHEN 5; 325 MG/1; MG/1
5-325 TABLET ORAL
Qty: 12 | Refills: 0 | Status: DISCONTINUED | COMMUNITY
Start: 2017-07-18 | End: 2018-01-31

## 2018-01-31 RX ORDER — DOCUSATE SODIUM 100 MG/1
100 CAPSULE ORAL
Refills: 0 | Status: DISCONTINUED | COMMUNITY
End: 2018-01-31

## 2018-02-02 LAB
ALBUMIN SERPL ELPH-MCNC: 4.5 G/DL
ALP BLD-CCNC: 76 U/L
ALT SERPL-CCNC: 14 U/L
ANION GAP SERPL CALC-SCNC: 13 MMOL/L
APPEARANCE: ABNORMAL
AST SERPL-CCNC: 21 U/L
BASOPHILS # BLD AUTO: 0.05 K/UL
BASOPHILS NFR BLD AUTO: 0.6 %
BILIRUB SERPL-MCNC: 0.6 MG/DL
BILIRUBIN URINE: NEGATIVE
BLOOD URINE: NEGATIVE
BUN SERPL-MCNC: 17 MG/DL
CALCIUM SERPL-MCNC: 9.9 MG/DL
CHLORIDE SERPL-SCNC: 105 MMOL/L
CHOLEST SERPL-MCNC: 181 MG/DL
CHOLEST/HDLC SERPL: 3.9 RATIO
CO2 SERPL-SCNC: 23 MMOL/L
COLOR: YELLOW
CREAT SERPL-MCNC: 1.01 MG/DL
EOSINOPHIL # BLD AUTO: 0.28 K/UL
EOSINOPHIL NFR BLD AUTO: 3.2 %
GLUCOSE QUALITATIVE U: NEGATIVE MG/DL
GLUCOSE SERPL-MCNC: 87 MG/DL
HBA1C MFR BLD HPLC: 4.8 %
HCT VFR BLD CALC: 51 %
HDLC SERPL-MCNC: 46 MG/DL
HGB BLD-MCNC: 17.2 G/DL
IMM GRANULOCYTES NFR BLD AUTO: 0.1 %
KETONES URINE: ABNORMAL
LDLC SERPL CALC-MCNC: 101 MG/DL
LEUKOCYTE ESTERASE URINE: NEGATIVE
LYMPHOCYTES # BLD AUTO: 2.95 K/UL
LYMPHOCYTES NFR BLD AUTO: 34.1 %
MAN DIFF?: NORMAL
MCHC RBC-ENTMCNC: 31.9 PG
MCHC RBC-ENTMCNC: 33.7 GM/DL
MCV RBC AUTO: 94.6 FL
MONOCYTES # BLD AUTO: 0.77 K/UL
MONOCYTES NFR BLD AUTO: 8.9 %
NEUTROPHILS # BLD AUTO: 4.59 K/UL
NEUTROPHILS NFR BLD AUTO: 53.1 %
NITRITE URINE: NEGATIVE
PH URINE: 5.5
PLATELET # BLD AUTO: 287 K/UL
POTASSIUM SERPL-SCNC: 5.1 MMOL/L
PROT SERPL-MCNC: 7.5 G/DL
PROTEIN URINE: NEGATIVE MG/DL
PSA SERPL-MCNC: 0.71 NG/ML
RBC # BLD: 5.39 M/UL
RBC # FLD: 14.8 %
SODIUM SERPL-SCNC: 141 MMOL/L
SPECIFIC GRAVITY URINE: 1.02
TRIGL SERPL-MCNC: 169 MG/DL
TSH SERPL-ACNC: 2.14 UIU/ML
UROBILINOGEN URINE: 1 MG/DL
WBC # FLD AUTO: 8.65 K/UL

## 2018-07-11 NOTE — H&P PST ADULT - PAIN SCALE PREFERRED, PROFILE
10:42 AM    7/11/18    Site: hand right, condition patent and no redness    # of attempts 1 numerical 0-10

## 2018-10-18 PROBLEM — K43.2 INCISIONAL HERNIA WITHOUT OBSTRUCTION OR GANGRENE: Chronic | Status: ACTIVE | Noted: 2017-07-11

## 2018-12-18 ENCOUNTER — RX RENEWAL (OUTPATIENT)
Age: 53
End: 2018-12-18

## 2019-02-01 ENCOUNTER — NON-APPOINTMENT (OUTPATIENT)
Age: 54
End: 2019-02-01

## 2019-02-01 ENCOUNTER — LABORATORY RESULT (OUTPATIENT)
Age: 54
End: 2019-02-01

## 2019-02-01 ENCOUNTER — APPOINTMENT (OUTPATIENT)
Dept: INTERNAL MEDICINE | Facility: CLINIC | Age: 54
End: 2019-02-01
Payer: COMMERCIAL

## 2019-02-01 VITALS
SYSTOLIC BLOOD PRESSURE: 122 MMHG | DIASTOLIC BLOOD PRESSURE: 87 MMHG | HEIGHT: 68 IN | TEMPERATURE: 98.6 F | BODY MASS INDEX: 33.49 KG/M2 | HEART RATE: 71 BPM | RESPIRATION RATE: 16 BRPM | OXYGEN SATURATION: 97 % | WEIGHT: 221 LBS

## 2019-02-01 DIAGNOSIS — D75.1 SECONDARY POLYCYTHEMIA: ICD-10-CM

## 2019-02-01 DIAGNOSIS — K21.9 GASTRO-ESOPHAGEAL REFLUX DISEASE W/OUT ESOPHAGITIS: ICD-10-CM

## 2019-02-01 DIAGNOSIS — Z12.5 ENCOUNTER FOR SCREENING FOR MALIGNANT NEOPLASM OF PROSTATE: ICD-10-CM

## 2019-02-01 DIAGNOSIS — K57.32 DIVERTICULITIS OF LARGE INTESTINE W/OUT PERFORATION OR ABSCESS W/OUT BLEEDING: ICD-10-CM

## 2019-02-01 DIAGNOSIS — K57.90 DIVERTICULOSIS OF INTESTINE, PART UNSPECIFIED, W/OUT PERFORATION OR ABSCESS W/OUT BLEEDING: ICD-10-CM

## 2019-02-01 DIAGNOSIS — Z23 ENCOUNTER FOR IMMUNIZATION: ICD-10-CM

## 2019-02-01 DIAGNOSIS — Z00.00 ENCOUNTER FOR GENERAL ADULT MEDICAL EXAMINATION W/OUT ABNORMAL FINDINGS: ICD-10-CM

## 2019-02-01 DIAGNOSIS — K43.2 INCISIONAL HERNIA W/OUT OBSTRUCTION OR GANGRENE: ICD-10-CM

## 2019-02-01 DIAGNOSIS — K57.92 DIVERTICULITIS OF INTESTINE, PART UNSPECIFIED, W/OUT PERFORATION OR ABSCESS W/OUT BLEEDING: ICD-10-CM

## 2019-02-01 PROCEDURE — 90715 TDAP VACCINE 7 YRS/> IM: CPT

## 2019-02-01 PROCEDURE — 90471 IMMUNIZATION ADMIN: CPT

## 2019-02-01 PROCEDURE — 93000 ELECTROCARDIOGRAM COMPLETE: CPT

## 2019-02-01 PROCEDURE — 99396 PREV VISIT EST AGE 40-64: CPT | Mod: 25

## 2019-02-03 LAB
25(OH)D3 SERPL-MCNC: 16.1 NG/ML
ALBUMIN SERPL ELPH-MCNC: 4.4 G/DL
ALP BLD-CCNC: 60 U/L
ALT SERPL-CCNC: 10 U/L
ANION GAP SERPL CALC-SCNC: 12 MMOL/L
APPEARANCE: ABNORMAL
AST SERPL-CCNC: 18 U/L
BASOPHILS # BLD AUTO: 0.05 K/UL
BASOPHILS NFR BLD AUTO: 0.7 %
BILIRUB SERPL-MCNC: 0.7 MG/DL
BILIRUBIN URINE: NEGATIVE
BLOOD URINE: NEGATIVE
BUN SERPL-MCNC: 15 MG/DL
CALCIUM SERPL-MCNC: 9.7 MG/DL
CHLORIDE SERPL-SCNC: 107 MMOL/L
CHOLEST SERPL-MCNC: 135 MG/DL
CHOLEST/HDLC SERPL: 3.1 RATIO
CO2 SERPL-SCNC: 23 MMOL/L
COLOR: YELLOW
CREAT SERPL-MCNC: 0.8 MG/DL
EOSINOPHIL # BLD AUTO: 0.22 K/UL
EOSINOPHIL NFR BLD AUTO: 3.1 %
GLUCOSE QUALITATIVE U: NEGATIVE MG/DL
GLUCOSE SERPL-MCNC: 94 MG/DL
HBA1C MFR BLD HPLC: 5 %
HCT VFR BLD CALC: 49.2 %
HDLC SERPL-MCNC: 44 MG/DL
HGB BLD-MCNC: 16.9 G/DL
IMM GRANULOCYTES NFR BLD AUTO: 0.1 %
KETONES URINE: ABNORMAL
LDLC SERPL CALC-MCNC: 77 MG/DL
LEUKOCYTE ESTERASE URINE: NEGATIVE
LYMPHOCYTES # BLD AUTO: 2.85 K/UL
LYMPHOCYTES NFR BLD AUTO: 40.2 %
MAN DIFF?: NORMAL
MCHC RBC-ENTMCNC: 31.8 PG
MCHC RBC-ENTMCNC: 34.3 GM/DL
MCV RBC AUTO: 92.5 FL
MONOCYTES # BLD AUTO: 0.67 K/UL
MONOCYTES NFR BLD AUTO: 9.4 %
NEUTROPHILS # BLD AUTO: 3.29 K/UL
NEUTROPHILS NFR BLD AUTO: 46.5 %
NITRITE URINE: NEGATIVE
PH URINE: 5
PLATELET # BLD AUTO: 251 K/UL
POTASSIUM SERPL-SCNC: 4.3 MMOL/L
PROT SERPL-MCNC: 7 G/DL
PROTEIN URINE: NEGATIVE MG/DL
PSA SERPL-MCNC: 0.87 NG/ML
RBC # BLD: 5.32 M/UL
RBC # FLD: 14 %
SODIUM SERPL-SCNC: 142 MMOL/L
SPECIFIC GRAVITY URINE: 1.03
TRIGL SERPL-MCNC: 72 MG/DL
TSH SERPL-ACNC: 1.56 UIU/ML
UROBILINOGEN URINE: NEGATIVE MG/DL
VIT B12 SERPL-MCNC: 362 PG/ML
WBC # FLD AUTO: 7.09 K/UL

## 2019-03-02 ENCOUNTER — TRANSCRIPTION ENCOUNTER (OUTPATIENT)
Age: 54
End: 2019-03-02

## 2019-05-10 ENCOUNTER — APPOINTMENT (OUTPATIENT)
Dept: INTERNAL MEDICINE | Facility: CLINIC | Age: 54
End: 2019-05-10
Payer: COMMERCIAL

## 2019-05-10 VITALS
HEART RATE: 77 BPM | HEIGHT: 68 IN | BODY MASS INDEX: 33.65 KG/M2 | OXYGEN SATURATION: 98 % | RESPIRATION RATE: 17 BRPM | TEMPERATURE: 98.4 F | DIASTOLIC BLOOD PRESSURE: 80 MMHG | WEIGHT: 222 LBS | SYSTOLIC BLOOD PRESSURE: 120 MMHG

## 2019-05-10 DIAGNOSIS — R51 HEADACHE: ICD-10-CM

## 2019-05-10 PROCEDURE — 99214 OFFICE O/P EST MOD 30 MIN: CPT

## 2019-05-31 ENCOUNTER — APPOINTMENT (OUTPATIENT)
Dept: ORTHOPEDIC SURGERY | Facility: CLINIC | Age: 54
End: 2019-05-31
Payer: COMMERCIAL

## 2019-05-31 VITALS
BODY MASS INDEX: 33.49 KG/M2 | DIASTOLIC BLOOD PRESSURE: 77 MMHG | WEIGHT: 221 LBS | SYSTOLIC BLOOD PRESSURE: 143 MMHG | HEART RATE: 81 BPM | HEIGHT: 68 IN

## 2019-05-31 DIAGNOSIS — M54.12 RADICULOPATHY, CERVICAL REGION: ICD-10-CM

## 2019-05-31 DIAGNOSIS — M25.511 PAIN IN RIGHT SHOULDER: ICD-10-CM

## 2019-05-31 PROCEDURE — 99203 OFFICE O/P NEW LOW 30 MIN: CPT

## 2019-05-31 NOTE — HISTORY OF PRESENT ILLNESS
[de-identified] : 53-year-old male with chief complaint of neck shoulder and right arm pain. He is a  several months after reaching for an object he tried anti-inflammatories rest and home exercises without relief. He is seen in the emergency room where radiographs of his shoulder were obtained. He had persistent symptoms numbness and tingling in his arm. An MRI of the cervical spine was obtained by the medical doctor and referred for shoulder evaluation. c/o headaches\par \par The patient's past medical history, past surgical history, medications, allergies, and social history were reviewed by me today with the patient and documented accordingly. In addition, the patient's family history, which is noncontributory to this visit, was also reviewed.\par

## 2019-05-31 NOTE — PHYSICAL EXAM
[de-identified] : General Exam\par \par Well developed, well nourished\par No apparent distress\par Oriented to person, place, and time\par Mood: Normal\par Affect: Normal\par Balance and coordination: Normal\par Gait: Normal\par \par Right shoulder/neck exam\par \par Inspection: No swelling, ecchymosis or gross deformity.\par Skin: No masses, No lesions\par Neck: Negative Spurlings, full ROM without pain\par Tenderness: No bicipital tenderness, no tenderness to the greater tuberosity/RTC insertion, no anterior shoulder/lesser tuberosity tenderness. No tenderness SC joint, clavicle, AC joint.\par ROM: 160/60/T6\par Impingement tests: Positive Back\par AC Joint: no pain with cross arm testing\par Biceps: Negative speed\par Strength: 5-/5 abduction, 5/5 external rotation, and internal rotation\par Neuro: AIN, PIN, Ulnar nerve motor intact\par Sensation: Intact to light touch in radial, median, ulnar, and axillary nerve distributions\par Vasc: 2+ radial pulse\par  [de-identified] : Radiographs of the right shoulder obtained outside were reviewed. His mild spurring of the glenoid rim otherwise well-maintained joint space no fracture\par \par MRI cervical spine reviewed. Report not available. Multilevel spondylosis herniations 5-6 right side foraminal narrowing\par \par

## 2019-06-23 ENCOUNTER — APPOINTMENT (OUTPATIENT)
Dept: MRI IMAGING | Facility: IMAGING CENTER | Age: 54
End: 2019-06-23
Payer: COMMERCIAL

## 2019-06-23 ENCOUNTER — OUTPATIENT (OUTPATIENT)
Dept: OUTPATIENT SERVICES | Facility: HOSPITAL | Age: 54
LOS: 1 days | End: 2019-06-23
Payer: COMMERCIAL

## 2019-06-23 DIAGNOSIS — M54.12 RADICULOPATHY, CERVICAL REGION: ICD-10-CM

## 2019-06-23 DIAGNOSIS — Z90.49 ACQUIRED ABSENCE OF OTHER SPECIFIED PARTS OF DIGESTIVE TRACT: Chronic | ICD-10-CM

## 2019-06-23 PROCEDURE — 73221 MRI JOINT UPR EXTREM W/O DYE: CPT

## 2019-06-23 PROCEDURE — 73221 MRI JOINT UPR EXTREM W/O DYE: CPT | Mod: 26,RT

## 2019-06-28 ENCOUNTER — APPOINTMENT (OUTPATIENT)
Dept: ORTHOPEDIC SURGERY | Facility: CLINIC | Age: 54
End: 2019-06-28
Payer: COMMERCIAL

## 2019-06-28 PROCEDURE — 20610 DRAIN/INJ JOINT/BURSA W/O US: CPT | Mod: RT

## 2019-06-28 PROCEDURE — 99213 OFFICE O/P EST LOW 20 MIN: CPT | Mod: 25

## 2019-06-28 NOTE — DISCUSSION/SUMMARY
[de-identified] : 54-year-old male right shoulder rotator cuff tear he does have some symptoms consistent with cervical radiculopathy. Will obtain spine consult. In terms of his shoulder he does a rotator cuff tear and weakness we discussed treatment options both surgical and nonsurgical. We will let her start a conservative program. He'll start physical therapy\par \par Injection: Right shoulder (Subacromial).\par Indication: [Rotator cuff tear\par A discussion was had with the patient regarding this procedure and all questions were answered. All risks, benefits and alternatives were discussed. These included but were not limited to bleeding, infection, and allergic reaction. Alcohol was used to clean the skin, and betadine was used to sterilize and prep the area in the posterior aspect of the right shoulder. Ethyl chloride spray was then used as a topical anesthetic. A 21-gauge needle was used to inject 4cc of 1% lidocaine and 1cc of 40mg/ml methylprednisolone into the right subacromial space. A sterile bandage was then applied. The patient tolerated the procedure well and there were no complications. \par \par If not improved we will consider rotator cuff repair he will followup in 6-8 weeks

## 2019-06-28 NOTE — HISTORY OF PRESENT ILLNESS
[de-identified] : 53-year-old male with chief complaint of neck shoulder and right arm pain. He is a  several months after reaching for an object he tried anti-inflammatories rest and home exercises without relief. He is seen in the emergency room where radiographs of his shoulder were obtained. He had persistent symptoms numbness and tingling in his arm. An MRI of the cervical spine was obtained by the medical doctor and referred for shoulder evaluation. c/o headaches\par A MRI was done since last visit, here to review results today.\par

## 2019-06-28 NOTE — REASON FOR VISIT
[Initial Visit] : an initial visit for [Follow-Up Visit] : a follow-up visit for [FreeTextEntry2] : R shoulder pain

## 2019-07-22 ENCOUNTER — APPOINTMENT (OUTPATIENT)
Dept: ORTHOPEDIC SURGERY | Facility: CLINIC | Age: 54
End: 2019-07-22
Payer: COMMERCIAL

## 2019-07-22 VITALS
DIASTOLIC BLOOD PRESSURE: 98 MMHG | SYSTOLIC BLOOD PRESSURE: 147 MMHG | HEART RATE: 70 BPM | HEIGHT: 70 IN | BODY MASS INDEX: 31.64 KG/M2 | WEIGHT: 221 LBS

## 2019-07-22 DIAGNOSIS — M47.812 SPONDYLOSIS W/OUT MYELOPATHY OR RADICULOPATHY, CERVICAL REGION: ICD-10-CM

## 2019-07-22 PROCEDURE — 72040 X-RAY EXAM NECK SPINE 2-3 VW: CPT

## 2019-07-22 PROCEDURE — 99215 OFFICE O/P EST HI 40 MIN: CPT

## 2019-07-24 ENCOUNTER — RX RENEWAL (OUTPATIENT)
Age: 54
End: 2019-07-24

## 2019-09-13 ENCOUNTER — APPOINTMENT (OUTPATIENT)
Dept: ORTHOPEDIC SURGERY | Facility: CLINIC | Age: 54
End: 2019-09-13
Payer: COMMERCIAL

## 2019-09-13 DIAGNOSIS — M75.121 COMPLETE ROTATOR CUFF TEAR OR RUPTURE OF RIGHT SHOULDER, NOT SPECIFIED AS TRAUMATIC: ICD-10-CM

## 2019-09-13 PROCEDURE — 99213 OFFICE O/P EST LOW 20 MIN: CPT

## 2019-09-13 NOTE — DISCUSSION/SUMMARY
[de-identified] : Status post corticosteroid injection for rotator cuff tear complete relief of symptoms is excellent range of motion and strength today he wishes to continue to observe his symptoms he can followup as needed if symptoms return. All questions answered

## 2019-09-13 NOTE — HISTORY OF PRESENT ILLNESS
[de-identified] : 54-year-old male with chief complaint of neck shoulder and right arm pain. He is a  several months after reaching for an object he tried anti-inflammatories rest and home exercises without relief. He is seen in the emergency room where radiographs of his shoulder were obtained. He had persistent symptoms numbness and tingling in his arm. An MRI of the cervical spine was obtained by the medical doctor and referred for shoulder evaluation. c/o headaches\par A MRI was done in past. He had inj last visit with complete relief

## 2019-09-13 NOTE — PHYSICAL EXAM
[de-identified] : Right shoulder/neck exam\par \par Inspection: No swelling, ecchymosis or gross deformity.\par Skin: No masses, No lesions\par Neck: Negative Spurlings, full ROM without pain\par Tenderness: No bicipital tenderness, no tenderness to the greater tuberosity/RTC insertion, no anterior shoulder/lesser tuberosity tenderness. No tenderness SC joint, clavicle, AC joint.\par ROM: 160/60/T6\par Impingement tests: Positive Back\par AC Joint: no pain with cross arm testing\par Biceps: Negative speed\par Strength: 5-/5 abduction, 5/5 external rotation, and internal rotation\par Neuro: AIN, PIN, Ulnar nerve motor intact\par Sensation: Intact to light touch in radial, median, ulnar, and axillary nerve distributions\par Vasc: 2+ radial pulse

## 2020-03-05 ENCOUNTER — APPOINTMENT (OUTPATIENT)
Dept: CARDIOLOGY | Facility: CLINIC | Age: 55
End: 2020-03-05
Payer: COMMERCIAL

## 2020-03-05 VITALS
WEIGHT: 227 LBS | HEIGHT: 70 IN | BODY MASS INDEX: 32.5 KG/M2 | DIASTOLIC BLOOD PRESSURE: 124 MMHG | OXYGEN SATURATION: 97 % | SYSTOLIC BLOOD PRESSURE: 161 MMHG | HEART RATE: 97 BPM

## 2020-03-05 PROCEDURE — 99244 OFF/OP CNSLTJ NEW/EST MOD 40: CPT

## 2020-03-10 ENCOUNTER — APPOINTMENT (OUTPATIENT)
Dept: CARDIOLOGY | Facility: CLINIC | Age: 55
End: 2020-03-10
Payer: COMMERCIAL

## 2020-03-10 PROCEDURE — 93306 TTE W/DOPPLER COMPLETE: CPT

## 2020-05-11 NOTE — PATIENT PROFILE ADULT. - FUNCTIONAL SCREEN CURRENT LEVEL: AMBULATION, MLM
[facility] Call Center    Phone Message    May a detailed message be left on voicemail: yes     Reason for Call: Patient nurse Lucina called and states she will make the last 10 minutes of patient appt, but has patient med info. Nurse number is [phone].    Action Taken: Message routed to:  Adult Clinics: Pulmonology p [id number]    Travel Screening: Not Applicable                                                                       (2) assistive person

## 2020-07-17 ENCOUNTER — APPOINTMENT (OUTPATIENT)
Dept: CARDIOLOGY | Facility: CLINIC | Age: 55
End: 2020-07-17
Payer: COMMERCIAL

## 2020-07-17 ENCOUNTER — NON-APPOINTMENT (OUTPATIENT)
Age: 55
End: 2020-07-17

## 2020-07-17 VITALS
BODY MASS INDEX: 33.5 KG/M2 | HEART RATE: 71 BPM | SYSTOLIC BLOOD PRESSURE: 164 MMHG | DIASTOLIC BLOOD PRESSURE: 116 MMHG | HEIGHT: 70 IN | OXYGEN SATURATION: 98 % | WEIGHT: 234 LBS

## 2020-07-17 PROCEDURE — 99214 OFFICE O/P EST MOD 30 MIN: CPT

## 2020-07-17 PROCEDURE — 93000 ELECTROCARDIOGRAM COMPLETE: CPT

## 2020-07-27 ENCOUNTER — APPOINTMENT (OUTPATIENT)
Dept: CARDIOLOGY | Facility: CLINIC | Age: 55
End: 2020-07-27
Payer: COMMERCIAL

## 2020-07-27 PROCEDURE — 93790 AMBL BP MNTR W/SW I&R: CPT

## 2021-01-12 NOTE — PATIENT PROFILE ADULT. - TEACHING/LEARNING FACTORS INFLUENCE READINESS TO LEARN
Last seen: 3/28/19  Follow up appointment: 3/2/21  Last filled: Metoprolol 1/7/21 and Amlodipine 10/22/20  Last BP: 128/80      Scripts eprescribed to pharmacy per MD       none

## 2021-02-23 RX ORDER — OLMESARTAN MEDOXOMIL 40 MG/1
40 TABLET, FILM COATED ORAL
Qty: 30 | Refills: 3 | Status: ACTIVE | COMMUNITY
Start: 2020-03-05

## 2021-11-11 NOTE — ASU PREOP CHECKLIST - TYPE OF SOLUTION
I will START or STAY ON the medications listed below when I get home from the hospital:    acetaminophen 325 mg oral tablet  -- 3 tab(s) by mouth every 6 hours, As Needed  -- Indication: For pain    ibuprofen 600 mg oral tablet  -- 1 tab(s) by mouth every 6 hours, As Needed  -- Indication: For pain    sertraline 25 mg oral tablet  -- 3 tab(s) by mouth once a day  -- Indication: For depression/anxiety   LR

## 2022-03-16 NOTE — ASU PREOP CHECKLIST - BMI (KG/M2)
[facility]    Medicine Progress Note - Hospitalist Service, GOLD TEAM 10    Date of Admission:  2/20/2022    Assessment & Plan         Melissa Elder is a 64 year old female with PMHx HTN, HLD, paroxysmal Afib, osteoporosis, GERD, severe COPD, previously requiring 2-3L NC O2 at rest.  Underwent b/l lung transplant with Dr. Robin on 02/21. Got 1u pRBC post-op, no overt bleeding source, monitoring. Extubated 02//22 to O2 NC and transferred to the floor. Pericardial drain pulled 2/24/22.      Had left chest tube pulled by [facility] 2/25, 2/26 IR placed new left chest pigtail for hydropneumothorax,      Patient developed encephalopathy, NH3 levels elevated to 57, Rapid improvement with treatment with doxycycline, subsequently cultures from clamshell wound grew Ureaplasm on 3/3.     Right chest tube removed by [facility] on 3/3, later pigtail catheter placed by IR to drain ureaplasma related empyema.     Routine inspection bronch on 3/1 complicated by hypoventilation in setting of oversedation requiring multiple Narcan doses.  Slow improvement in hypercapnia with NIPPV overnight.     Had sniff test 3/11 that was negative, 3/15 had gastric emptying that was negative. 3/15 ordered nighttime oxymetry testing, 3/16 reordered given oxygen needs on the fence. Continues care inpatient before discharge home.          Today's plan   -reviewed gastric emptying study that was negative   -Send medications to discharge pharmacy     IV drips   NA      Plan     ID   Prior history of infection/colonization with Haemophilus influenzae (2017). Donor (OSH) cultures with P-S MSSA; ([facility]) with Strep mitis, Actinomyces odontolyticus, MSSA x2, and C. kruseii (concern for possible aspiration).  Recipient cultures at time of transplant with Actinomyces odonotolyticus.  Bronch cultures (2/22) with Saccharomyces cerevisiae (no indication to treat per Dr. Ghosh unless pt. acutely declines clinically, 3/1) and  C. albicans.  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  Pressor weaned off 2/22 and pt. extubated. Prior history of infection/colonization with Haemophilus influenzae (2017).  IgG adequate (2/20).  MRSA nares negative 2/21.  Broad spectrum ABX empirically post-op with vanc and ceftaz (2/21-2/22), stopped after 24h per Dr. Lala to target known donor cultures on POD #1. Donor cultures ([facility]) with Strep mitis, Actinomyces odontolyticus, and Kenyatta kruseii. Recipient cultures with Actinomyces odonotolyticus.  Acid fast bacillus in sputum from 3/2/22  - Respiratory support with HFNC and BiPAP (discussed below)  - Nebs: Levalbuterol and Mucomyst QID  - Aggressive pulmonary toilet with chest physiotherapy QID as well as Aerobika and incentive spirometry q1h w/a  - Daily CXR while chest tubes in   - Await explant pathology  - Continue ceftriaxone for 2 weeks through 3/8 followed by amoxicillin for 3 months  - monitoring sputum for AFB  - Immune suppression and microbial ppx per daily transplant pulm note  - Continue Valtrex to 1000 mg TID x 14 days for HSV (through 3/11) then complete ppx per protocol (see pulm note)   - Continue diuresis as needed with goal to stay net negative      Acute hypoxic and hypercarbic respiratory failure 3/1, improving   3/1 noted to have slowly rising bicarb on daily labs. Had inspection bronchoscopy 3/1 as well and became oversedated and required multiple doses of narcan. VBG obtained post procedure showed hypercapnea to  99. Hypercarbic respiratory failure likely multifactorial including oversedation, poor diaphragm excursion (SNIFF test 3/3), volume overload, and atelectasis. Started on BiPAP.   - Continue BiPAP at night, HFNC/NC during the day. Wean O2 to keep sats > 92%.   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 Ammonia level checked due to concern for post-transplant hyperammonemia which was mildly elevated at 57, but repeat 49.   - Mycoplasma/ureaplasma cultures collected and Transplant ID consulted               - Sputum/Pleural fluid +ureaplasma on 3/4. Started on Levofloxacin/Doxycycline for double coverage.       Leukocytosis now resolved   Afebrile, but WBC started rising 2/27 from 14.6 to 23.1 3/2. Evaluation included CT chest/abdomen/pelvis without overt evidence of infection, and pan-culture. Pleural and sputum culture did grow +Ureaplasma and she was started on therapy (as above). C. Diff checked and negative. Leukocytosis has since been improving.   - Repeat CT Abdomen/Pelvis 3/6/22 for interval follow up from initial scan 3/2, as there was concern for enteritis as well as area of pancreatic/kashif-hepatic artery inflammation vs. Infection vs. malignancy.               - Repeat CT with improved small bowel dilation, some persistent perienteric fat stranding accentuated by motion artifact, and unremarkable appearing pancreas   - Continue antibiotics as above      Diarrhea   Dilated bowel on imaging   Noted to have increased loose stools 2/25 likely due to mmf and TF. Fiber added to TF. C.diff checked and negative. Abdominal imaging including CT and XR have shown diffusely dilated bowel, without concern for obstruction. Possibly  ileus or gastroparesis from transplant. However patient not experiencing nausea, vomiting, or decreased stool output.   - Repeat CT 3/6/22 with interval improvement   - Continue to monitor abdominal exam, stool output   - Okay to have immodium PRN      Post op pain   - Erector spinae catheters removed   - gabapentin 100 mg nightly  - tylenol 975 mg Q8H   - Dilaudid 0.2-0.4 mg Q2H PRN   - oxycodone 5-10 mg Q4H PRN   - robaxin 500 mg Q6H scheduled      Paroxysmal Afib   HTN  She was on diltiazem prior to lung transplantation and had not been on anticoagulation. She was  Started on metoprolol in the ICU.  - Continue Metoprolol tartrate to 25 mg BID   - Restarted PTA diltiazem now at 60 mg Q6H, if tolerated can transition back to  mg XL.   - No anticoagulation at this time      Moderate protein calorie malnutrition  - Nutrition following   - On TF, will ask about cycling overnight      Stress induced hyperglycemia  Did not need insulin prior to admission but sugars have been rising despite sliding scale coverage.  - Endocrinology consulted for assistance, appreciate recommendations   - Please see DM team daily note      HLD  Mild CAD   Noted on transplant workup. Started rosuvastatin 40 mg daily--Held 2/28 due to rising LFTs    - restarted rosuvastatin 3/8 will follow LFTs     Acute blood loss anemia  Acute blood loss thrombocytopenia  Secondary to surgery. Will continue to monitor.   - Transfuse Hgb < 7, platelets < 50   - Hemolysis labs negative 3/3, on dapsone, continue to monitor      Sternotomy  Surgical Incision  - Sternal precautions  - Postoperative incision management per protocol  - PT/OT/CR      Elevated LFTs, resolved   Noted on labs 2/26 and rising. AST, ALP, Bili WNL. No RUQ or abdominal pain. Imaging including RUQ ultrasound WNL. Liza-portal edema noted on CT, and exam shows volume overload. Could be the result of congestion versus medication adverse effect. LFTs have since normalized. Will  continue to monitor.  - CMP daily   - monitoring closely with restarting statin         Diet: Snacks/Supplements Adult: Glucerna; Between Meals  Calorie Counts  Regular Diet Adult  Diet    DVT Prophylaxis: Heparin SQ  Ratliff Catheter: Not present  Central Lines: None  Cardiac Monitoring: None  Code Status: Full Code      Disposition Plan   Expected Discharge: 03/17/2022     Anticipated discharge location: home;inpatient rehabilitation facility    Delays:            The patient's care was discussed with the Bedside Nurse and Patient.    Smith Weston MD  Hospitalist Service, GOLD TEAM 10  [facility]  Securely message with the Vocera Web Console (learn more here)  Text page via [facility] Paging/Directory   Please see signed in provider for up to date coverage information      Clinically Significant Risk Factors Present on Admission                    ______________________________________________________________________    Interval History   Patient is doing well, She has no new symptoms, Patient does not report any new abdominal pain, difficulty urinating,fevers or malaise.     Data reviewed today: I reviewed all medications, new labs and imaging results over the last 24 hours. I personally reviewed no images or EKG's today.    Physical Exam   Vital Signs: Temp: 98.3  F (36.8  C) Temp src: Oral BP: 125/62 Pulse: 102   Resp: 20 SpO2: 94 % O2 Device: None (Room air)    Weight: 148 lbs 11.2 oz  Constitutional: awake, alert, cooperative, no apparent distress, and appears stated age  Eyes: Lids and lashes normal, pupils equal, round and reactive to light, extra ocular muscles intact, sclera clear, conjunctiva normal  ENT: Normocephalic, without obvious abnormality, atraumatic, sinuses nontender on palpation, external ears without lesions, oral pharynx with moist mucous membranes, tonsils without erythema or exudates, gums normal and good dentition.  Hematologic / Lymphatic: no  cervical lymphadenopathy  Respiratory: No increased work of breathing, good air exchange, clear to auscultation bilaterally, no crackles or wheezing  Cardiovascular: Normal apical impulse, regular rate and rhythm, normal S1 and S2, no S3 or S4, and no murmur noted  GI: No scars, normal bowel sounds, soft, non-distended, non-tender, no masses palpated, no hepatosplenomegally  Genitounirinary:   Skin: no bruising or bleeding  Musculoskeletal: There is no redness, warmth, or swelling of the joints.  Full range of motion noted.  Motor strength is 5 out of 5 all extremities bilaterally.  Tone is normal.  Neurologic: Awake, alert, oriented to name, place and time.  Cranial nerves II-XII are grossly intact.  Motor is 5 out of 5 bilaterally.  Cerebellar finger to nose, heel to shin intact.  Sensory is intact.  Babinski down going, Romberg negative, and gait is normal.  Neuropsychiatric: General: normal, calm and normal eye contact    Data   Recent Labs   Lab 03/16/22  1140 03/16/22  0619 03/16/22  0146 03/15/22  1337 03/15/22  0643 03/14/22  0640 03/14/22  0551   WBC  --  8.7  --   --  8.3  --  9.0   HGB  --  8.6*  --   --  7.9*  --  8.3*   MCV  --  100  --   --  102*  --  100   PLT  --  378  --   --  361  --  413   NA  --  138  --   --  136  --  139   POTASSIUM  --  4.2  --   --  4.0  --  4.5   CHLORIDE  --  98  --   --  97  --  97   CO2  --  35*  --   --  34*  --  39*   BUN  --  27  --   --  27  --  28   CR  --  0.52  --   --  0.50*  --  0.53   ANIONGAP  --  5  --   --  5  --  3   MINISTERIO  --  8.9  --   --  8.3*  --  8.6   * 139* 156*   < > 147*   < > 153*   ALBUMIN  --  2.7*  --   --  2.5*  --  2.7*   PROTTOTAL  --  5.6*  --   --  5.2*  --  5.7*   BILITOTAL  --  0.5  --   --  0.4  --  0.4   ALKPHOS  --  117  --   --  111  --  117   ALT  --  25  --   --  25  --  26   AST  --  13  --   --  12  --  14    < > = values in this interval not displayed.     No results found for this or any previous visit (from the past 24  35.9 hour(s)).  Medications     dextrose 70 mL/hr at 03/15/22 0110     - MEDICATION INSTRUCTIONS -       - MEDICATION INSTRUCTIONS -       - MEDICATION INSTRUCTIONS -       - MEDICATION INSTRUCTIONS -         acetylcysteine  2 mL Nebulization 2 times daily     acyclovir  400 mg Oral BID     amoxicillin  500 mg Oral Q8H JUANA     aspirin  81 mg Oral Daily     [Held by provider] fiber modular (NUTRISOURCE FIBER)  1 packet Per Feeding Tube BID    And     [Held by provider] banatrol plus  1 packet Per Feeding Tube Daily at 4 pm     calcium carbonate 600 mg-vitamin D 400 units  1 tablet Oral BID w/meals     dapsone  50 mg Oral Daily     diltiazem ER COATED BEADS  240 mg Oral Daily     doxycycline hyclate  100 mg Oral Q12H JUANA     famotidine  20 mg Oral BID     fluticasone  1 spray Both Nostrils Daily     furosemide  40 mg Oral Daily     gabapentin  100 mg Oral At Bedtime     [START ON 3/17/2022] glipiZIDE  5 mg Oral QAM AC     heparin ANTICOAGULANT  5,000 Units Subcutaneous Q8H     insulin aspart  1-5 Units Subcutaneous TID w/meals     insulin aspart   Subcutaneous TID w/meals     insulin aspart  1-5 Units Subcutaneous At Bedtime     levalbuterol  1.25 mg Nebulization 2 times daily     lidocaine 3%, phenylephrine 0.25% solution for irrigation  5 mL Irrigation Once     loperamide  2 mg Oral TID     loratadine  10 mg Oral Daily     magnesium gluconate  500 mg Oral Daily     magnesium oxide  400 mg Oral BID     methocarbamol  500 mg Oral 4x Daily     metoprolol tartrate  25 mg Oral BID     multivitamin w/minerals  1 tablet Oral Daily     mycophenolic acid  720 mg Oral BID     nystatin  1,000,000 Units Swish & Swallow 4x Daily     pantoprazole  40 mg Oral BID AC     predniSONE  12.5 mg Oral BID     [Held by provider] protein modular  1 packet Per Feeding Tube BID     rosuvastatin  5 mg Oral Daily     simethicone  80 mg Oral 4x Daily     sodium chloride (PF)  3 mL Intracatheter Q8H     sodium chloride (PF)  3 mL Intracatheter Q8H      tacrolimus  3 mg Oral QPM     tacrolimus  3.5 mg Oral QAM

## 2022-11-07 ENCOUNTER — APPOINTMENT (OUTPATIENT)
Dept: CARDIOLOGY | Facility: CLINIC | Age: 57
End: 2022-11-07

## 2022-11-07 ENCOUNTER — NON-APPOINTMENT (OUTPATIENT)
Age: 57
End: 2022-11-07

## 2022-11-07 VITALS
DIASTOLIC BLOOD PRESSURE: 105 MMHG | BODY MASS INDEX: 34.93 KG/M2 | WEIGHT: 244 LBS | OXYGEN SATURATION: 96 % | SYSTOLIC BLOOD PRESSURE: 157 MMHG | HEART RATE: 73 BPM | HEIGHT: 70 IN

## 2022-11-07 VITALS — DIASTOLIC BLOOD PRESSURE: 94 MMHG | SYSTOLIC BLOOD PRESSURE: 158 MMHG

## 2022-11-07 DIAGNOSIS — R07.9 CHEST PAIN, UNSPECIFIED: ICD-10-CM

## 2022-11-07 PROCEDURE — 93000 ELECTROCARDIOGRAM COMPLETE: CPT

## 2022-11-07 PROCEDURE — 99215 OFFICE O/P EST HI 40 MIN: CPT | Mod: 25

## 2022-11-07 RX ORDER — AMOXICILLIN 500 MG/1
500 CAPSULE ORAL
Qty: 21 | Refills: 0 | Status: DISCONTINUED | COMMUNITY
Start: 2022-09-13

## 2022-11-07 RX ORDER — AZITHROMYCIN 250 MG/1
250 TABLET, FILM COATED ORAL
Qty: 6 | Refills: 0 | Status: DISCONTINUED | COMMUNITY
Start: 2022-05-25

## 2022-11-07 RX ORDER — HYDROCHLOROTHIAZIDE 12.5 MG/1
12.5 CAPSULE ORAL
Qty: 30 | Refills: 0 | Status: DISCONTINUED | COMMUNITY
Start: 2022-09-13

## 2022-11-07 RX ORDER — IBUPROFEN 600 MG/1
600 TABLET, FILM COATED ORAL
Qty: 20 | Refills: 0 | Status: DISCONTINUED | COMMUNITY
Start: 2022-09-13

## 2022-11-07 RX ORDER — HYDROCODONE BITARTRATE AND ACETAMINOPHEN 5; 325 MG/1; MG/1
5-325 TABLET ORAL
Qty: 12 | Refills: 0 | Status: DISCONTINUED | COMMUNITY
Start: 2022-05-25

## 2022-11-17 ENCOUNTER — APPOINTMENT (OUTPATIENT)
Dept: CARDIOLOGY | Facility: CLINIC | Age: 57
End: 2022-11-17

## 2022-11-17 PROCEDURE — 93306 TTE W/DOPPLER COMPLETE: CPT

## 2022-11-21 ENCOUNTER — NON-APPOINTMENT (OUTPATIENT)
Age: 57
End: 2022-11-21

## 2022-11-22 ENCOUNTER — APPOINTMENT (OUTPATIENT)
Dept: CARDIOLOGY | Facility: CLINIC | Age: 57
End: 2022-11-22

## 2022-11-22 PROCEDURE — 93015 CV STRESS TEST SUPVJ I&R: CPT

## 2022-11-22 PROCEDURE — A9500: CPT

## 2022-11-22 PROCEDURE — 78452 HT MUSCLE IMAGE SPECT MULT: CPT

## 2023-01-26 ENCOUNTER — APPOINTMENT (OUTPATIENT)
Dept: CARDIOLOGY | Facility: CLINIC | Age: 58
End: 2023-01-26
Payer: COMMERCIAL

## 2023-01-26 ENCOUNTER — NON-APPOINTMENT (OUTPATIENT)
Age: 58
End: 2023-01-26

## 2023-01-26 VITALS
DIASTOLIC BLOOD PRESSURE: 100 MMHG | WEIGHT: 229 LBS | HEART RATE: 67 BPM | BODY MASS INDEX: 32.78 KG/M2 | OXYGEN SATURATION: 98 % | HEIGHT: 70 IN | SYSTOLIC BLOOD PRESSURE: 161 MMHG

## 2023-01-26 VITALS — SYSTOLIC BLOOD PRESSURE: 142 MMHG | DIASTOLIC BLOOD PRESSURE: 100 MMHG

## 2023-01-26 DIAGNOSIS — R06.09 OTHER FORMS OF DYSPNEA: ICD-10-CM

## 2023-01-26 PROCEDURE — 99214 OFFICE O/P EST MOD 30 MIN: CPT | Mod: 25

## 2023-01-26 PROCEDURE — 93000 ELECTROCARDIOGRAM COMPLETE: CPT

## 2023-02-25 NOTE — ASU PREOP CHECKLIST - PATIENT SENT TO
HEMATOLOGY ONCOLOGY CONSULT     Patient is a 81y old  Male who presents with a chief complaint of Pleural effusion, LAD (25 Feb 2023 13:03)      HPI:  81 year old male with PMH atrial fibrillation, HTN and chronic urinary retention who customarily self catheterizes at home for the last year, presented to [facility] by ambulance from home on 2/17/2023 with c/o progressive shortness of breath.    Pt was intubated in the field and brought to ED.    Of note, patient recently found to have supraclavicular lymph node enlargement with associated leukocytosis, is being evaluated for lymphoma. Patient also recently admitted to [facility] where he was noted to have large pleural effusion that was drained.  According to family lymph node biopsy was inconclusive and patient had thoracentesis which revealed no infection or malignancy.  Since discharge from [facility] has been oxygen dependant at home which is also a new development.  At [facility], patient was admitted to ICU for acute respiratory failure with hypercapnia. CTA chest negative for PE, showed large R pleural effusion with mediastinal and hilar lymphadenopathy. A pigtail was placed and drained over 3 L of fluid. Patient was started on empiric antibiotics and completed 5d course on 2/22/2023.  Pleural fluid cs negative and final. Blood cultures negative and final.  Given presence of lymphadenopathy and persistent leukocytosis, underlying malignancy is suspected. Hematology/oncology was consulted. Recommended transfer to [facility] for further investigation.   Pt was accepted by thoracic surgeon, Dr Nathaniel Galvan and is being evaluated by EBUS/mediastinoscopy and possible pleurX placement.   At [facility], urology was consulted for pt's chronic urinary retention and a plummer was placed. Family did not want trial of void and agreed with continued plummer use.   Patient was successfully extubated and started on CPAP in [facility] ICU. Patient was downgraded to medical floor and was titrated down to nasal canula. Physical therapy evaluated patient and MORALES is recommended. Pt was then transferred to [facility]. PTC intact on suction with no air leak and pt completes steroid taper 2/26/2023.   Last dose of eliquis 2/22/2023 at 6am, pt is on Lovenox 90mg BID for DVT ppx.     (24 Feb 2023 23:24)       ROS negative except as indicated in the HPI.    PAST MEDICAL & SURGICAL HISTORY:  Atrial fibrillation      Hypertension      Urinary retention      No significant past surgical history          SOCIAL HISTORY:    FAMILY HISTORY:      MEDICATIONS  (STANDING):  albuterol    0.083% 2.5 milliGRAM(s) Nebulizer every 6 hours  atenolol  Tablet 75 milliGRAM(s) Oral daily  chlorhexidine 2% Cloths 1 Application(s) Topical <User Schedule>  enoxaparin Injectable 90 milliGRAM(s) SubCutaneous once  escitalopram 5 milliGRAM(s) Oral daily  melatonin 6 milliGRAM(s) Oral at bedtime  polyethylene glycol 3350 17 Gram(s) Oral daily  senna 2 Tablet(s) Oral at bedtime  simvastatin 40 milliGRAM(s) Oral at bedtime  tamsulosin 0.4 milliGRAM(s) Oral at bedtime    MEDICATIONS  (PRN):  ALPRAZolam 0.25 milliGRAM(s) Oral daily PRN anxiety      Allergies    pertussis vaccines (Rash)  shellfish (Rash)    Intolerances        Vital Signs Last 24 Hrs  T(C): 36.5 (25 Feb 2023 12:17), Max: 37.1 (24 Feb 2023 21:36)  T(F): 97.7 (25 Feb 2023 12:17), Max: 98.7 (24 Feb 2023 21:36)  HR: 67 (25 Feb 2023 12:17) (60 - 102)  BP: 94/55 (25 Feb 2023 12:17) (94/55 - 129/72)  BP(mean): --  RR: 19 (25 Feb 2023 12:17) (18 - 19)  SpO2: 91% (25 Feb 2023 12:17) (91% - 98%)    Parameters below as of 25 Feb 2023 12:17  Patient On (Oxygen Delivery Method): nasal cannula  O2 Flow (L/min): 2      PHYSICAL EXAM  General: adult in NAD  HEENT: clear oropharynx, anicteric sclera, pink conjunctiva  Neck: supple  CV: normal S1/S2 with no murmur rubs or gallops  Lungs: positive air movement b/l ant lungs, clear to auscultation, no wheezes, no rales  Abdomen: soft non-tender non-distended, no hepatosplenomegaly  Ext: no clubbing cyanosis or edema  Skin: no rashes and no petechiae  Neuro: alert and oriented X 4, no focal deficits      02-24-23 @ 07:01  -  02-25-23 @ 07:00  --------------------------------------------------------  IN: 200 mL / OUT: 800 mL / NET: -600 mL    02-25-23 @ 07:01  -  02-25-23 @ 15:06  --------------------------------------------------------  IN: 600 mL / OUT: 240 mL / NET: 360 mL      LABS:                          12.0   19.52 )-----------( 183      ( 25 Feb 2023 06:28 )             37.8         Mean Cell Volume : 86.5 fL  Mean Cell Hemoglobin : 27.5 pg  Mean Cell Hemoglobin Concentration : 31.7 gm/dL  Auto Neutrophil # : x  Auto Lymphocyte # : x  Auto Monocyte # : x  Auto Eosinophil # : x  Auto Basophil # : x  Auto Neutrophil % : x  Auto Lymphocyte % : x  Auto Monocyte % : x  Auto Eosinophil % : x  Auto Basophil % : x      02-25    136  |  98  |  20  ----------------------------<  122<H>  4.3   |  28  |  0.46<L>    Ca    8.5      25 Feb 2023 06:28    TPro  5.4<L>  /  Alb  1.8<L>  /  TBili  0.6  /  DBili  x   /  AST  31  /  ALT  103<H>  /  AlkPhos  92  02-24                             HEMATOLOGY ONCOLOGY CONSULT     Patient is a 81y old  Male who presents with a chief complaint of Pleural effusion, LAD (25 Feb 2023 13:03)      HPI:  81 year old male with PMH atrial fibrillation, HTN and chronic urinary retention who customarily self catheterizes at home for the last year, presented to [facility] by ambulance from home on 2/17/2023 with c/o progressive shortness of breath.    Pt was intubated in the field and brought to ED.    Of note, patient recently found to have supraclavicular lymph node enlargement with associated leukocytosis, is being evaluated for lymphoma. Patient also recently admitted to  where he was noted to have large pleural effusion that was drained.  According to family lymph node biopsy was inconclusive and patient had thoracentesis which revealed no infection or malignancy.  Since discharge from  has been oxygen dependant at home which is also a new development.  At [place], patient was admitted to ICU for acute respiratory failure with hypercapnia. CTA chest negative for PE, showed large R pleural effusion with mediastinal and hilar lymphadenopathy. A pigtail was placed and drained over 3 L of fluid. Patient was started on empiric antibiotics and completed 5d course on 2/22/2023.  Pleural fluid cs negative and final. Blood cultures negative and final.  Given presence of lymphadenopathy and persistent leukocytosis, underlying malignancy is suspected. Hematology/oncology was consulted. Recommended transfer to [facility] for further investigation.   Pt was accepted by thoracic surgeon, Dr Nathaniel Galvan and is being evaluated by EBUS/mediastinoscopy and possible pleurX placement.   At [place], urology was consulted for pt's chronic urinary retention and a plummer was placed. Family did not want trial of void and agreed with continued plummer use.   Patient was successfully extubated and started on CPAP in [place] ICU. Patient was downgraded to medical floor and was titrated down to nasal canula. Physical therapy evaluated patient and MORALES is recommended. Pt was then transferred to [facility]. PTC intact on suction with no air leak and pt completes steroid taper 2/26/2023.   Last dose of eliquis 2/22/2023 at 6am, pt is on Lovenox 90mg BID for DVT ppx.     (24 Feb 2023 23:24)       ROS negative except as indicated in the HPI.    PAST MEDICAL & SURGICAL HISTORY:  Atrial fibrillation      Hypertension      Urinary retention      No significant past surgical history          SOCIAL HISTORY:    FAMILY HISTORY:      MEDICATIONS  (STANDING):  albuterol    0.083% 2.5 milliGRAM(s) Nebulizer every 6 hours  atenolol  Tablet 75 milliGRAM(s) Oral daily  chlorhexidine 2% Cloths 1 Application(s) Topical <User Schedule>  enoxaparin Injectable 90 milliGRAM(s) SubCutaneous once  escitalopram 5 milliGRAM(s) Oral daily  melatonin 6 milliGRAM(s) Oral at bedtime  polyethylene glycol 3350 17 Gram(s) Oral daily  senna 2 Tablet(s) Oral at bedtime  simvastatin 40 milliGRAM(s) Oral at bedtime  tamsulosin 0.4 milliGRAM(s) Oral at bedtime    MEDICATIONS  (PRN):  ALPRAZolam 0.25 milliGRAM(s) Oral daily PRN anxiety      Allergies    pertussis vaccines (Rash)  shellfish (Rash)    Intolerances        Vital Signs Last 24 Hrs  T(C): 36.5 (25 Feb 2023 12:17), Max: 37.1 (24 Feb 2023 21:36)  T(F): 97.7 (25 Feb 2023 12:17), Max: 98.7 (24 Feb 2023 21:36)  HR: 67 (25 Feb 2023 12:17) (60 - 102)  BP: 94/55 (25 Feb 2023 12:17) (94/55 - 129/72)  BP(mean): --  RR: 19 (25 Feb 2023 12:17) (18 - 19)  SpO2: 91% (25 Feb 2023 12:17) (91% - 98%)    Parameters below as of 25 Feb 2023 12:17  Patient On (Oxygen Delivery Method): nasal cannula  O2 Flow (L/min): 2      PHYSICAL EXAM  General: adult in NAD  HEENT: clear oropharynx, anicteric sclera, pink conjunctiva  Neck: supple  CV: normal S1/S2 with no murmur rubs or gallops  Lungs: chest tube in place, b/l crackles   Abdomen: soft non-tender non-distended, no hepatosplenomegaly  Ext: no clubbing cyanosis or edema  Skin: no rashes and no petechiae  Neuro: alert and oriented X 4, no focal deficits      02-24-23 @ 07:01  -  02-25-23 @ 07:00  --------------------------------------------------------  IN: 200 mL / OUT: 800 mL / NET: -600 mL    02-25-23 @ 07:01  -  02-25-23 @ 15:06  --------------------------------------------------------  IN: 600 mL / OUT: 240 mL / NET: 360 mL      LABS:                          12.0   19.52 )-----------( 183      ( 25 Feb 2023 06:28 )             37.8         Mean Cell Volume : 86.5 fL  Mean Cell Hemoglobin : 27.5 pg  Mean Cell Hemoglobin Concentration : 31.7 gm/dL  Auto Neutrophil # : x  Auto Lymphocyte # : x  Auto Monocyte # : x  Auto Eosinophil # : x  Auto Basophil # : x  Auto Neutrophil % : x  Auto Lymphocyte % : x  Auto Monocyte % : x  Auto Eosinophil % : x  Auto Basophil % : x      02-25    136  |  98  |  20  ----------------------------<  122<H>  4.3   |  28  |  0.46<L>    Ca    8.5      25 Feb 2023 06:28    TPro  5.4<L>  /  Alb  1.8<L>  /  TBili  0.6  /  DBili  x   /  AST  31  /  ALT  103<H>  /  AlkPhos  92  02-24                             HEMATOLOGY ONCOLOGY CONSULT     Patient is a 81y old  Male who presents with a chief complaint of Pleural effusion, LAD (25 Feb 2023 13:03)      HPI:  81 year old male with PMH atrial fibrillation, HTN and chronic urinary retention who customarily self catheterizes at home for the last year, presented to [facility] by ambulance from home on 2/17/2023 with c/o progressive shortness of breath.    Pt was intubated in the field and brought to ED.    Of note, patient recently found to have supraclavicular lymph node enlargement with associated leukocytosis, is being evaluated for lymphoma. Patient also recently admitted to [facility] where he was noted to have large pleural effusion that was drained.  According to family lymph node biopsy was inconclusive and patient had thoracentesis which revealed no infection or malignancy.  Since discharge from [facility] has been oxygen dependant at home which is also a new development.  At [place], patient was admitted to ICU for acute respiratory failure with hypercapnia. CTA chest negative for PE, showed large R pleural effusion with mediastinal and hilar lymphadenopathy. A pigtail was placed and drained over 3 L of fluid. Patient was started on empiric antibiotics and completed 5d course on 2/22/2023.  Pleural fluid cs negative and final. Blood cultures negative and final.  Given presence of lymphadenopathy and persistent leukocytosis, underlying malignancy is suspected. Hematology/oncology was consulted. Recommended transfer to [facility] for further investigation.   Pt was accepted by thoracic surgeon, Dr Nathaniel Galvan and is being evaluated by EBUS/mediastinoscopy and possible pleurX placement.   At [place], urology was consulted for pt's chronic urinary retention and a plummer was placed. Family did not want trial of void and agreed with continued plummer use.   Patient was successfully extubated and started on CPAP in [place] ICU. Patient was downgraded to medical floor and was titrated down to nasal canula. Physical therapy evaluated patient and MORALES is recommended. Pt was then transferred to [facility]. PTC intact on suction with no air leak and pt completes steroid taper 2/26/2023.   Last dose of eliquis 2/22/2023 at 6am, pt is on Lovenox 90mg BID for DVT ppx.     (24 Feb 2023 23:24)       ROS negative except as indicated in the HPI.    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    FAMILY HISTORY:  non-contributory    MEDICATIONS  (STANDING):  albuterol    0.083% 2.5 milliGRAM(s) Nebulizer every 6 hours  atenolol  Tablet 75 milliGRAM(s) Oral daily  chlorhexidine 2% Cloths 1 Application(s) Topical <User Schedule>  enoxaparin Injectable 90 milliGRAM(s) SubCutaneous once  escitalopram 5 milliGRAM(s) Oral daily  melatonin 6 milliGRAM(s) Oral at bedtime  polyethylene glycol 3350 17 Gram(s) Oral daily  senna 2 Tablet(s) Oral at bedtime  simvastatin 40 milliGRAM(s) Oral at bedtime  tamsulosin 0.4 milliGRAM(s) Oral at bedtime    MEDICATIONS  (PRN):  ALPRAZolam 0.25 milliGRAM(s) Oral daily PRN anxiety      Allergies    pertussis vaccines (Rash)  shellfish (Rash)    Intolerances        Vital Signs Last 24 Hrs  T(C): 36.5 (25 Feb 2023 12:17), Max: 37.1 (24 Feb 2023 21:36)  T(F): 97.7 (25 Feb 2023 12:17), Max: 98.7 (24 Feb 2023 21:36)  HR: 67 (25 Feb 2023 12:17) (60 - 102)  BP: 94/55 (25 Feb 2023 12:17) (94/55 - 129/72)  BP(mean): --  RR: 19 (25 Feb 2023 12:17) (18 - 19)  SpO2: 91% (25 Feb 2023 12:17) (91% - 98%)    Parameters below as of 25 Feb 2023 12:17  Patient On (Oxygen Delivery Method): nasal cannula  O2 Flow (L/min): 2      PHYSICAL EXAM  General: adult in NAD  HEENT: clear oropharynx, anicteric sclera, pink conjunctiva  Neck: supple  CV: normal S1/S2 with no murmur rubs or gallops  Lungs: chest tube in place, b/l crackles; reduced breath sounds at right base  Abdomen: soft non-tender non-distended, no hepatosplenomegaly  Ext: no clubbing cyanosis or edema  Skin: no rashes and no petechiae  Neuro: alert and oriented X 4, no focal deficits      02-24-23 @ 07:01  -  02-25-23 @ 07:00  --------------------------------------------------------  IN: 200 mL / OUT: 800 mL / NET: -600 mL    02-25-23 @ 07:01  -  02-25-23 @ 15:06  --------------------------------------------------------  IN: 600 mL / OUT: 240 mL / NET: 360 mL      LABS:                          12.0   19.52 )-----------( 183      ( 25 Feb 2023 06:28 )             37.8         Mean Cell Volume : 86.5 fL  Mean Cell Hemoglobin : 27.5 pg  Mean Cell Hemoglobin Concentration : 31.7 gm/dL      02-25    136  |  98  |  20  ----------------------------<  122<H>  4.3   |  28  |  0.46<L>    Ca    8.5      25 Feb 2023 06:28    TPro  5.4<L>  /  Alb  1.8<L>  /  TBili  0.6  /  DBili  x   /  AST  31  /  ALT  103<H>  /  AlkPhos  92  02-24     operating room

## 2023-03-28 ENCOUNTER — NON-APPOINTMENT (OUTPATIENT)
Age: 58
End: 2023-03-28

## 2023-03-28 ENCOUNTER — APPOINTMENT (OUTPATIENT)
Dept: CARDIOLOGY | Facility: CLINIC | Age: 58
End: 2023-03-28
Payer: COMMERCIAL

## 2023-03-28 VITALS
OXYGEN SATURATION: 97 % | BODY MASS INDEX: 31.92 KG/M2 | DIASTOLIC BLOOD PRESSURE: 95 MMHG | HEART RATE: 67 BPM | WEIGHT: 223 LBS | HEIGHT: 70 IN | SYSTOLIC BLOOD PRESSURE: 143 MMHG

## 2023-03-28 VITALS — SYSTOLIC BLOOD PRESSURE: 138 MMHG | DIASTOLIC BLOOD PRESSURE: 88 MMHG

## 2023-03-28 PROCEDURE — 93000 ELECTROCARDIOGRAM COMPLETE: CPT

## 2023-03-28 PROCEDURE — 99214 OFFICE O/P EST MOD 30 MIN: CPT | Mod: 25

## 2023-08-10 ENCOUNTER — APPOINTMENT (OUTPATIENT)
Dept: CARDIOLOGY | Facility: CLINIC | Age: 58
End: 2023-08-10
Payer: COMMERCIAL

## 2023-08-10 VITALS
DIASTOLIC BLOOD PRESSURE: 86 MMHG | OXYGEN SATURATION: 96 % | HEIGHT: 70 IN | HEART RATE: 78 BPM | SYSTOLIC BLOOD PRESSURE: 129 MMHG

## 2023-08-10 DIAGNOSIS — R94.31 ABNORMAL ELECTROCARDIOGRAM [ECG] [EKG]: ICD-10-CM

## 2023-08-10 PROCEDURE — 99214 OFFICE O/P EST MOD 30 MIN: CPT | Mod: 25

## 2023-08-10 PROCEDURE — 93000 ELECTROCARDIOGRAM COMPLETE: CPT

## 2023-08-10 RX ORDER — DICLOFENAC SODIUM 50 MG/1
50 TABLET, DELAYED RELEASE ORAL
Qty: 30 | Refills: 0 | Status: DISCONTINUED | COMMUNITY
Start: 2019-03-02 | End: 2023-08-10

## 2023-08-10 NOTE — CARDIOLOGY SUMMARY
[___] : [unfilled] [No Ischemia] : no Ischemia [___] : [unfilled] [None] : normal LV function [de-identified] : [place].  ASHUTOSH.  MAXWP.  PAC

## 2023-08-10 NOTE — DISCUSSION/SUMMARY
[FreeTextEntry1] : This is a 58 year old obese man who presents to the office for a follow up visit.    He has been on Weight Watchers, and is losing weight.   He is back on his BP medications, was diagnosed with ABENA, but is not using CPAP.  He will continue olmesartan 40 QD, and will continue amlodipine 5 qd.  HIs BP is mostly controlled.  I hope that with continued and sustained weight loss he may be able to get it even lower.   We discussed the benefits of a healthy diet, regular exercise and physical activity, and weight loss in detail.  I will see him back in 6 months.

## 2023-08-10 NOTE — HISTORY OF PRESENT ILLNESS
[FreeTextEntry1] : This is a 58 year old obese man who presents to the office for a follow up visit. He has been on Weight Watchers, and is losing weight.   He is back on his BP medications, was diagnosed with ABENA, is not using CPAP.   He is not reporting chest pain.   He denies dyspnea, PND, orthopnea, LE swelling, dizziness, or syncope.    He is walking more.  He is being consistent with his diet.  His weight continues to slowly decrease.

## 2023-08-10 NOTE — PHYSICAL EXAM
[General Appearance - In No Acute Distress] : no acute distress [Normal Conjunctiva] : the conjunctiva exhibited no abnormalities [Eyelids - No Xanthelasma] : the eyelids demonstrated no xanthelasmas [Normal Oral Mucosa] : normal oral mucosa [No Oral Pallor] : no oral pallor [Normal Oropharynx] : normal oropharynx [Normal Jugular Venous V Waves Present] : normal jugular venous V waves present [Respiration, Rhythm And Depth] : normal respiratory rhythm and effort [Exaggerated Use Of Accessory Muscles For Inspiration] : no accessory muscle use [Auscultation Breath Sounds / Voice Sounds] : lungs were clear to auscultation bilaterally [Bowel Sounds] : normal bowel sounds [Abdomen Soft] : soft [Abdomen Tenderness] : non-tender [Abnormal Walk] : normal gait [Gait - Sufficient For Exercise Testing] : the gait was sufficient for exercise testing [Nail Clubbing] : no clubbing of the fingernails [Cyanosis, Localized] : no localized cyanosis [Petechial Hemorrhages (___cm)] : no petechial hemorrhages [Skin Color & Pigmentation] : normal skin color and pigmentation [] : no rash [No Venous Stasis] : no venous stasis [Skin Lesions] : no skin lesions [Oriented To Time, Place, And Person] : oriented to person, place, and time [Affect] : the affect was normal [Mood] : the mood was normal [No Anxiety] : not feeling anxious [Not Palpable] : not palpable [No Precordial Heave] : no precordial heave was noted [Normal Rate] : normal [Rhythm Regular] : regular [Normal S1] : normal S1 [Normal S2] : normal S2 [Distant] : the heart sounds were distant [No Murmur] : no murmurs heard [1+] : left 1+ [No Abnormalities] : the abdominal aorta was not enlarged and no bruit was heard [No Pitting Edema] : no pitting edema present [FreeTextEntry1] : No JVD. [S3] : no S3 [Right Carotid Bruit] : no bruit heard over the right carotid [Left Carotid Bruit] : no bruit heard over the left carotid [Bruit] : no bruit heard

## 2024-02-06 ENCOUNTER — APPOINTMENT (OUTPATIENT)
Dept: CARDIOLOGY | Facility: CLINIC | Age: 59
End: 2024-02-06
Payer: COMMERCIAL

## 2024-02-06 ENCOUNTER — NON-APPOINTMENT (OUTPATIENT)
Age: 59
End: 2024-02-06

## 2024-02-06 VITALS
BODY MASS INDEX: 30.92 KG/M2 | DIASTOLIC BLOOD PRESSURE: 96 MMHG | HEART RATE: 71 BPM | OXYGEN SATURATION: 97 % | HEIGHT: 70 IN | SYSTOLIC BLOOD PRESSURE: 140 MMHG | WEIGHT: 216 LBS

## 2024-02-06 VITALS — SYSTOLIC BLOOD PRESSURE: 136 MMHG | DIASTOLIC BLOOD PRESSURE: 88 MMHG

## 2024-02-06 DIAGNOSIS — I10 ESSENTIAL (PRIMARY) HYPERTENSION: ICD-10-CM

## 2024-02-06 PROCEDURE — G2211 COMPLEX E/M VISIT ADD ON: CPT

## 2024-02-06 PROCEDURE — 93000 ELECTROCARDIOGRAM COMPLETE: CPT

## 2024-02-06 PROCEDURE — 99214 OFFICE O/P EST MOD 30 MIN: CPT

## 2024-02-06 NOTE — DISCUSSION/SUMMARY
[FreeTextEntry1] : This is a 58 year old obese man who presents to the office for a follow up visit.       He is back on his BP medications, was diagnosed with ABENA, but is not using CPAP.  He will continue olmesartan 40 QD, and will continue amlodipine 5 qd.  HIs BP is mostly controlled.  I hope that with continued and sustained weight loss he may be able to get it even lower.   We discussed the benefits of a healthy diet, regular exercise and physical activity, and weight loss in detail.  I will see him back in 6 months.  [EKG obtained to assist in diagnosis and management of assessed problem(s)] : EKG obtained to assist in diagnosis and management of assessed problem(s)

## 2024-02-06 NOTE — HISTORY OF PRESENT ILLNESS
[FreeTextEntry1] : This is a 58 year old obese man who presents to the office for a follow up visit. He is back on his BP medications, was diagnosed with ABENA, is not using CPAP.   He is not reporting chest pain.   He denies dyspnea, PND, orthopnea, LE swelling, dizziness, or syncope.    He is walking more.  He is being consistent with his diet.  His weight continues to slowly decrease.  He had blood work in December that was normal.  His BP has been controlled.

## 2024-02-06 NOTE — CARDIOLOGY SUMMARY
[___] : [unfilled] [No Ischemia] : no Ischemia [None] : normal LV function [de-identified] : [place].  ASHUTOSH.  MAXWP.  PAC

## 2024-02-06 NOTE — PHYSICAL EXAM
[General Appearance - In No Acute Distress] : no acute distress [Normal Conjunctiva] : the conjunctiva exhibited no abnormalities [Eyelids - No Xanthelasma] : the eyelids demonstrated no xanthelasmas [Normal Oral Mucosa] : normal oral mucosa [No Oral Pallor] : no oral pallor [Normal Oropharynx] : normal oropharynx [Normal Jugular Venous V Waves Present] : normal jugular venous V waves present [Respiration, Rhythm And Depth] : normal respiratory rhythm and effort [Exaggerated Use Of Accessory Muscles For Inspiration] : no accessory muscle use [Auscultation Breath Sounds / Voice Sounds] : lungs were clear to auscultation bilaterally [Bowel Sounds] : normal bowel sounds [Abdomen Tenderness] : non-tender [Abdomen Soft] : soft [Abnormal Walk] : normal gait [Gait - Sufficient For Exercise Testing] : the gait was sufficient for exercise testing [Nail Clubbing] : no clubbing of the fingernails [Cyanosis, Localized] : no localized cyanosis [Petechial Hemorrhages (___cm)] : no petechial hemorrhages [Skin Color & Pigmentation] : normal skin color and pigmentation [] : no rash [No Venous Stasis] : no venous stasis [Skin Lesions] : no skin lesions [Oriented To Time, Place, And Person] : oriented to person, place, and time [Affect] : the affect was normal [Mood] : the mood was normal [No Anxiety] : not feeling anxious [Not Palpable] : not palpable [No Precordial Heave] : no precordial heave was noted [Normal Rate] : normal [Rhythm Regular] : regular [Normal S1] : normal S1 [Normal S2] : normal S2 [Distant] : the heart sounds were distant [No Murmur] : no murmurs heard [1+] : left 1+ [No Abnormalities] : the abdominal aorta was not enlarged and no bruit was heard [No Pitting Edema] : no pitting edema present [FreeTextEntry1] : No JVD. [S3] : no S3 [Right Carotid Bruit] : no bruit heard over the right carotid [Left Carotid Bruit] : no bruit heard over the left carotid [Bruit] : no bruit heard

## 2024-03-19 RX ORDER — AMLODIPINE BESYLATE 5 MG/1
5 TABLET ORAL DAILY
Qty: 90 | Refills: 3 | Status: ACTIVE | COMMUNITY
Start: 2023-01-26

## 2024-08-06 ENCOUNTER — APPOINTMENT (OUTPATIENT)
Dept: CARDIOLOGY | Facility: CLINIC | Age: 59
End: 2024-08-06

## 2024-08-06 PROCEDURE — 99214 OFFICE O/P EST MOD 30 MIN: CPT | Mod: 25

## 2024-08-06 PROCEDURE — 93000 ELECTROCARDIOGRAM COMPLETE: CPT

## 2024-08-06 PROCEDURE — G2211 COMPLEX E/M VISIT ADD ON: CPT | Mod: NC

## 2024-08-06 NOTE — CARDIOLOGY SUMMARY
[___] : [unfilled] [No Ischemia] : no Ischemia [None] : normal LV function [de-identified] : [place].  LOCOB.  PRWP

## 2024-08-06 NOTE — HISTORY OF PRESENT ILLNESS
[FreeTextEntry1] : This is a 59 year old obese man who presents to the office for a follow up visit. He is back on his BP medications, was diagnosed with ABENA, is not using CPAP.   He is not reporting chest pain.   He denies dyspnea, PND, orthopnea, LE swelling, dizziness, or syncope.    He is walking more.  He is being consistent with his diet.  His weight continues to slowly decrease.  He had blood work in December that was normal.  His BP has been controlled.

## 2024-08-06 NOTE — DISCUSSION/SUMMARY
[FreeTextEntry1] : This is a 59 year old obese man who presents to the office for a follow up visit.     He is back on his BP medications, was diagnosed with ABENA, but is not using CPAP.  He will continue olmesartan 40 QD, and will continue amlodipine 5 qd.  HIs BP is mostly controlled.  I hope that with continued and sustained weight loss he may be able to get it even lower.   We discussed the benefits of a healthy diet, regular exercise and physical activity, and weight loss in detail.  I will see him back in 6 months.  [EKG obtained to assist in diagnosis and management of assessed problem(s)] : EKG obtained to assist in diagnosis and management of assessed problem(s)

## 2025-02-06 ENCOUNTER — NON-APPOINTMENT (OUTPATIENT)
Age: 60
End: 2025-02-06

## 2025-02-06 ENCOUNTER — APPOINTMENT (OUTPATIENT)
Dept: CARDIOLOGY | Facility: CLINIC | Age: 60
End: 2025-02-06
Payer: COMMERCIAL

## 2025-02-06 VITALS
OXYGEN SATURATION: 94 % | DIASTOLIC BLOOD PRESSURE: 79 MMHG | SYSTOLIC BLOOD PRESSURE: 111 MMHG | HEIGHT: 70 IN | BODY MASS INDEX: 30.92 KG/M2 | HEART RATE: 80 BPM | WEIGHT: 216 LBS

## 2025-02-06 DIAGNOSIS — R94.31 ABNORMAL ELECTROCARDIOGRAM [ECG] [EKG]: ICD-10-CM

## 2025-02-06 PROCEDURE — 93000 ELECTROCARDIOGRAM COMPLETE: CPT

## 2025-02-06 PROCEDURE — G2211 COMPLEX E/M VISIT ADD ON: CPT | Mod: NC

## 2025-02-06 PROCEDURE — 99214 OFFICE O/P EST MOD 30 MIN: CPT

## 2025-02-24 ENCOUNTER — APPOINTMENT (OUTPATIENT)
Dept: CARDIOLOGY | Facility: CLINIC | Age: 60
End: 2025-02-24
Payer: COMMERCIAL

## 2025-02-24 PROCEDURE — 93306 TTE W/DOPPLER COMPLETE: CPT

## 2025-08-19 ENCOUNTER — APPOINTMENT (OUTPATIENT)
Dept: CARDIOLOGY | Facility: CLINIC | Age: 60
End: 2025-08-19
Payer: COMMERCIAL

## 2025-08-19 VITALS
WEIGHT: 220 LBS | OXYGEN SATURATION: 98 % | DIASTOLIC BLOOD PRESSURE: 104 MMHG | BODY MASS INDEX: 31.5 KG/M2 | HEART RATE: 87 BPM | SYSTOLIC BLOOD PRESSURE: 144 MMHG | HEIGHT: 70 IN

## 2025-08-19 DIAGNOSIS — R94.31 ABNORMAL ELECTROCARDIOGRAM [ECG] [EKG]: ICD-10-CM

## 2025-08-19 DIAGNOSIS — I10 ESSENTIAL (PRIMARY) HYPERTENSION: ICD-10-CM

## 2025-08-19 PROCEDURE — 99214 OFFICE O/P EST MOD 30 MIN: CPT

## 2025-08-19 PROCEDURE — G2211 COMPLEX E/M VISIT ADD ON: CPT | Mod: NC

## 2025-08-19 PROCEDURE — 93000 ELECTROCARDIOGRAM COMPLETE: CPT
